# Patient Record
Sex: FEMALE | Race: OTHER | HISPANIC OR LATINO | Employment: FULL TIME | URBAN - METROPOLITAN AREA
[De-identification: names, ages, dates, MRNs, and addresses within clinical notes are randomized per-mention and may not be internally consistent; named-entity substitution may affect disease eponyms.]

---

## 2022-03-30 ENCOUNTER — HOSPITAL ENCOUNTER (OUTPATIENT)
Dept: RADIOLOGY | Facility: MEDICAL CENTER | Age: 42
End: 2022-03-30
Attending: PHYSICIAN ASSISTANT
Payer: COMMERCIAL

## 2022-03-30 DIAGNOSIS — Z12.31 VISIT FOR SCREENING MAMMOGRAM: ICD-10-CM

## 2022-03-30 PROCEDURE — 77063 BREAST TOMOSYNTHESIS BI: CPT

## 2022-04-11 ENCOUNTER — HOSPITAL ENCOUNTER (OUTPATIENT)
Dept: RADIOLOGY | Facility: MEDICAL CENTER | Age: 42
End: 2022-04-11
Attending: PHYSICIAN ASSISTANT
Payer: COMMERCIAL

## 2022-04-11 DIAGNOSIS — R92.8 ABNORMAL MAMMOGRAM: ICD-10-CM

## 2022-04-11 PROCEDURE — G0279 TOMOSYNTHESIS, MAMMO: HCPCS

## 2022-04-11 PROCEDURE — 76642 ULTRASOUND BREAST LIMITED: CPT | Mod: RT

## 2022-04-19 ENCOUNTER — HOSPITAL ENCOUNTER (OUTPATIENT)
Dept: RADIOLOGY | Facility: MEDICAL CENTER | Age: 42
End: 2022-04-19
Attending: PHYSICIAN ASSISTANT
Payer: COMMERCIAL

## 2022-04-19 DIAGNOSIS — R92.8 ABNORMAL FINDINGS ON DIAGNOSTIC IMAGING OF BREAST: ICD-10-CM

## 2022-04-19 LAB — PATHOLOGY CONSULT NOTE: NORMAL

## 2022-04-19 PROCEDURE — 19084 BX BREAST ADD LESION US IMAG: CPT | Mod: LT

## 2022-04-19 PROCEDURE — 88305 TISSUE EXAM BY PATHOLOGIST: CPT

## 2022-04-19 PROCEDURE — 19083 BX BREAST 1ST LESION US IMAG: CPT | Mod: RT

## 2022-04-19 PROCEDURE — 19083 BX BREAST 1ST LESION US IMAG: CPT | Mod: LT

## 2022-04-20 ENCOUNTER — TELEPHONE (OUTPATIENT)
Dept: RADIOLOGY | Facility: MEDICAL CENTER | Age: 42
End: 2022-04-20
Payer: COMMERCIAL

## 2022-06-20 ENCOUNTER — HOSPITAL ENCOUNTER (OUTPATIENT)
Facility: MEDICAL CENTER | Age: 42
End: 2022-06-20
Attending: OBSTETRICS & GYNECOLOGY | Admitting: OBSTETRICS & GYNECOLOGY
Payer: COMMERCIAL

## 2022-07-29 ENCOUNTER — PRE-ADMISSION TESTING (OUTPATIENT)
Dept: ADMISSIONS | Facility: MEDICAL CENTER | Age: 42
End: 2022-07-29
Attending: OBSTETRICS & GYNECOLOGY
Payer: COMMERCIAL

## 2022-07-29 RX ORDER — BENZONATATE 100 MG/1
CAPSULE ORAL
COMMUNITY
Start: 2022-07-26 | End: 2023-01-24

## 2022-07-29 RX ORDER — FLUTICASONE PROPIONATE AND SALMETEROL 500; 50 UG/1; UG/1
POWDER RESPIRATORY (INHALATION)
COMMUNITY
Start: 2022-06-08

## 2022-07-29 RX ORDER — ALBUTEROL SULFATE 1.25 MG/3ML
SOLUTION RESPIRATORY (INHALATION)
COMMUNITY
Start: 2022-07-26 | End: 2022-07-29

## 2022-07-29 RX ORDER — ACETAMINOPHEN 500 MG
500-1000 TABLET ORAL EVERY 6 HOURS PRN
COMMUNITY
End: 2023-01-24

## 2022-07-29 RX ORDER — IBUPROFEN 200 MG
200 TABLET ORAL EVERY 6 HOURS PRN
COMMUNITY
End: 2023-01-24

## 2022-07-29 RX ORDER — METOPROLOL SUCCINATE 25 MG/1
25 TABLET, EXTENDED RELEASE ORAL DAILY
COMMUNITY
Start: 2022-06-08

## 2022-07-29 RX ORDER — AMLODIPINE BESYLATE 5 MG/1
5 TABLET ORAL DAILY
COMMUNITY
Start: 2022-06-26

## 2022-07-29 NOTE — OR NURSING
7/15/22 pt tested positive for covid with sxs.  As of 7/29/22 pt still has sxs of congestion, cough, loss of voice.  Pt tested negative 7/28/22 at home.  Dr. Morales's office notified.

## 2022-08-26 ENCOUNTER — HOSPITAL ENCOUNTER (OUTPATIENT)
Facility: MEDICAL CENTER | Age: 42
End: 2022-08-26
Attending: OBSTETRICS & GYNECOLOGY | Admitting: OBSTETRICS & GYNECOLOGY
Payer: MEDICAID

## 2022-09-16 ENCOUNTER — PRE-ADMISSION TESTING (OUTPATIENT)
Dept: ADMISSIONS | Facility: MEDICAL CENTER | Age: 42
End: 2022-09-16
Attending: OBSTETRICS & GYNECOLOGY
Payer: MEDICAID

## 2022-09-16 VITALS — BODY MASS INDEX: 27.62 KG/M2 | HEIGHT: 65 IN | WEIGHT: 165.79 LBS

## 2022-09-16 DIAGNOSIS — Z01.810 PRE-OPERATIVE CARDIOVASCULAR EXAMINATION: ICD-10-CM

## 2022-09-16 DIAGNOSIS — Z01.812 PRE-OPERATIVE LABORATORY EXAMINATION: ICD-10-CM

## 2022-09-16 LAB
ALBUMIN SERPL BCP-MCNC: 4.7 G/DL (ref 3.2–4.9)
ALBUMIN/GLOB SERPL: 1.7 G/DL
ALP SERPL-CCNC: 92 U/L (ref 30–99)
ALT SERPL-CCNC: 32 U/L (ref 2–50)
AMORPH CRY #/AREA URNS HPF: PRESENT /HPF
ANION GAP SERPL CALC-SCNC: 12 MMOL/L (ref 7–16)
APPEARANCE UR: ABNORMAL
AST SERPL-CCNC: 21 U/L (ref 12–45)
BACTERIA #/AREA URNS HPF: ABNORMAL /HPF
BASOPHILS # BLD AUTO: 0.7 % (ref 0–1.8)
BASOPHILS # BLD: 0.08 K/UL (ref 0–0.12)
BILIRUB SERPL-MCNC: 0.4 MG/DL (ref 0.1–1.5)
BILIRUB UR QL STRIP.AUTO: NEGATIVE
BUN SERPL-MCNC: 20 MG/DL (ref 8–22)
CALCIUM SERPL-MCNC: 9.4 MG/DL (ref 8.5–10.5)
CAOX CRY #/AREA URNS HPF: ABNORMAL /HPF
CHLORIDE SERPL-SCNC: 101 MMOL/L (ref 96–112)
CO2 SERPL-SCNC: 25 MMOL/L (ref 20–33)
COLOR UR: YELLOW
CREAT SERPL-MCNC: 0.99 MG/DL (ref 0.5–1.4)
EKG IMPRESSION: NORMAL
EOSINOPHIL # BLD AUTO: 0.12 K/UL (ref 0–0.51)
EOSINOPHIL NFR BLD: 1.1 % (ref 0–6.9)
EPI CELLS #/AREA URNS HPF: ABNORMAL /HPF
ERYTHROCYTE [DISTWIDTH] IN BLOOD BY AUTOMATED COUNT: 45.1 FL (ref 35.9–50)
GFR SERPLBLD CREATININE-BSD FMLA CKD-EPI: 72 ML/MIN/1.73 M 2
GLOBULIN SER CALC-MCNC: 2.8 G/DL (ref 1.9–3.5)
GLUCOSE SERPL-MCNC: 70 MG/DL (ref 65–99)
GLUCOSE UR STRIP.AUTO-MCNC: NEGATIVE MG/DL
HCT VFR BLD AUTO: 40.9 % (ref 37–47)
HGB BLD-MCNC: 14.3 G/DL (ref 12–16)
HYALINE CASTS #/AREA URNS LPF: ABNORMAL /LPF
IMM GRANULOCYTES # BLD AUTO: 0.03 K/UL (ref 0–0.11)
IMM GRANULOCYTES NFR BLD AUTO: 0.3 % (ref 0–0.9)
KETONES UR STRIP.AUTO-MCNC: ABNORMAL MG/DL
LEUKOCYTE ESTERASE UR QL STRIP.AUTO: ABNORMAL
LYMPHOCYTES # BLD AUTO: 2.83 K/UL (ref 1–4.8)
LYMPHOCYTES NFR BLD: 26.2 % (ref 22–41)
MCH RBC QN AUTO: 33.3 PG (ref 27–33)
MCHC RBC AUTO-ENTMCNC: 35 G/DL (ref 33.6–35)
MCV RBC AUTO: 95.1 FL (ref 81.4–97.8)
MICRO URNS: ABNORMAL
MONOCYTES # BLD AUTO: 0.91 K/UL (ref 0–0.85)
MONOCYTES NFR BLD AUTO: 8.4 % (ref 0–13.4)
NEUTROPHILS # BLD AUTO: 6.83 K/UL (ref 2–7.15)
NEUTROPHILS NFR BLD: 63.3 % (ref 44–72)
NITRITE UR QL STRIP.AUTO: NEGATIVE
NRBC # BLD AUTO: 0 K/UL
NRBC BLD-RTO: 0 /100 WBC
PH UR STRIP.AUTO: 5.5 [PH] (ref 5–8)
PLATELET # BLD AUTO: 416 K/UL (ref 164–446)
PMV BLD AUTO: 9.3 FL (ref 9–12.9)
POTASSIUM SERPL-SCNC: 3.7 MMOL/L (ref 3.6–5.5)
PROT SERPL-MCNC: 7.5 G/DL (ref 6–8.2)
PROT UR QL STRIP: NEGATIVE MG/DL
RBC # BLD AUTO: 4.3 M/UL (ref 4.2–5.4)
RBC # URNS HPF: ABNORMAL /HPF
RBC UR QL AUTO: ABNORMAL
SODIUM SERPL-SCNC: 138 MMOL/L (ref 135–145)
SP GR UR STRIP.AUTO: 1.03
UROBILINOGEN UR STRIP.AUTO-MCNC: 1 MG/DL
WBC # BLD AUTO: 10.8 K/UL (ref 4.8–10.8)
WBC #/AREA URNS HPF: ABNORMAL /HPF

## 2022-09-16 PROCEDURE — 80053 COMPREHEN METABOLIC PANEL: CPT

## 2022-09-16 PROCEDURE — 93005 ELECTROCARDIOGRAM TRACING: CPT

## 2022-09-16 PROCEDURE — 85025 COMPLETE CBC W/AUTO DIFF WBC: CPT

## 2022-09-16 PROCEDURE — 93010 ELECTROCARDIOGRAM REPORT: CPT | Performed by: INTERNAL MEDICINE

## 2022-09-16 PROCEDURE — 81001 URINALYSIS AUTO W/SCOPE: CPT

## 2022-09-16 PROCEDURE — 36415 COLL VENOUS BLD VENIPUNCTURE: CPT

## 2022-09-19 ENCOUNTER — HOSPITAL ENCOUNTER (OUTPATIENT)
Dept: LAB | Facility: MEDICAL CENTER | Age: 42
End: 2022-09-19
Attending: ORTHOPAEDIC SURGERY
Payer: MEDICAID

## 2022-09-19 LAB
BASOPHILS # BLD AUTO: 0.7 % (ref 0–1.8)
BASOPHILS # BLD: 0.06 K/UL (ref 0–0.12)
CRP SERPL HS-MCNC: <0.3 MG/DL (ref 0–0.75)
EOSINOPHIL # BLD AUTO: 0.11 K/UL (ref 0–0.51)
EOSINOPHIL NFR BLD: 1.2 % (ref 0–6.9)
ERYTHROCYTE [DISTWIDTH] IN BLOOD BY AUTOMATED COUNT: 45.5 FL (ref 35.9–50)
ERYTHROCYTE [SEDIMENTATION RATE] IN BLOOD BY WESTERGREN METHOD: 6 MM/HOUR (ref 0–25)
HCT VFR BLD AUTO: 41 % (ref 37–47)
HGB BLD-MCNC: 13.7 G/DL (ref 12–16)
IMM GRANULOCYTES # BLD AUTO: 0.04 K/UL (ref 0–0.11)
IMM GRANULOCYTES NFR BLD AUTO: 0.4 % (ref 0–0.9)
LYMPHOCYTES # BLD AUTO: 2.76 K/UL (ref 1–4.8)
LYMPHOCYTES NFR BLD: 30.2 % (ref 22–41)
MCH RBC QN AUTO: 32.2 PG (ref 27–33)
MCHC RBC AUTO-ENTMCNC: 33.4 G/DL (ref 33.6–35)
MCV RBC AUTO: 96.5 FL (ref 81.4–97.8)
MONOCYTES # BLD AUTO: 0.7 K/UL (ref 0–0.85)
MONOCYTES NFR BLD AUTO: 7.7 % (ref 0–13.4)
NEUTROPHILS # BLD AUTO: 5.48 K/UL (ref 2–7.15)
NEUTROPHILS NFR BLD: 59.8 % (ref 44–72)
NRBC # BLD AUTO: 0 K/UL
NRBC BLD-RTO: 0 /100 WBC
PLATELET # BLD AUTO: 381 K/UL (ref 164–446)
PMV BLD AUTO: 9.7 FL (ref 9–12.9)
RBC # BLD AUTO: 4.25 M/UL (ref 4.2–5.4)
RHEUMATOID FACT SER IA-ACNC: <10 IU/ML (ref 0–14)
URATE SERPL-MCNC: 5.3 MG/DL (ref 1.9–8.2)
WBC # BLD AUTO: 9.2 K/UL (ref 4.8–10.8)

## 2022-09-19 PROCEDURE — 85652 RBC SED RATE AUTOMATED: CPT

## 2022-09-19 PROCEDURE — 86431 RHEUMATOID FACTOR QUANT: CPT

## 2022-09-19 PROCEDURE — 85025 COMPLETE CBC W/AUTO DIFF WBC: CPT

## 2022-09-19 PROCEDURE — 86200 CCP ANTIBODY: CPT

## 2022-09-19 PROCEDURE — 84550 ASSAY OF BLOOD/URIC ACID: CPT

## 2022-09-19 PROCEDURE — 86812 HLA TYPING A B OR C: CPT

## 2022-09-19 PROCEDURE — 86140 C-REACTIVE PROTEIN: CPT

## 2022-09-19 PROCEDURE — 36415 COLL VENOUS BLD VENIPUNCTURE: CPT

## 2022-09-19 PROCEDURE — 86038 ANTINUCLEAR ANTIBODIES: CPT

## 2022-09-21 LAB — HLA-B27 QL FC: NEGATIVE

## 2022-09-22 LAB
CCP IGG SERPL-ACNC: 2 UNITS (ref 0–19)
NUCLEAR IGG SER QL IA: NORMAL

## 2022-09-23 ASSESSMENT — ENCOUNTER SYMPTOMS
CARDIOVASCULAR NEGATIVE: 1
CONSTITUTIONAL NEGATIVE: 1
GASTROINTESTINAL NEGATIVE: 1
RESPIRATORY NEGATIVE: 1

## 2022-09-23 NOTE — H&P
Obstetrics & Gynecology History & Physical Note    Date of Service  2022    Chief Complaint  Scheduled for TLH bilateral salpingectomy possible unilateral or bilateral oopherectomy, lysis of adhesions, and treatment of endometriosis and all other indicated procedures.    History of Presenting Illness  Gina Little is a 42 y.o.   . She is being admitted for Scheduled for TLH bilateral salpingectomy possible unilateral or bilateral oopherectomy, lysis of adhesions, and treatment of endometriosis and all other indicated procedures.    There are no problems to display for this patient.      Obstetric History  OB History   No obstetric history on file.       Gynecologic History  AUb for years going into anemia . Chronic pelvic pain from endometriosis.  surgery was rescheduled due to Covid.     Review of Systems  Review of Systems   Constitutional: Negative.    Respiratory: Negative.     Cardiovascular: Negative.    Gastrointestinal: Negative.    Genitourinary: Negative.      Medical History   has a past medical history of Anesthesia (2022), Arrhythmia, Arthritis (2022), Asthma (2022), Endometriosis (2022), Fatty liver, Gynecological disorder (2022), Infectious disease (2022), Palpitations, Pneumonia (2022), PONV (postoperative nausea and vomiting) (2022), and PONV (postoperative nausea and vomiting) (2022).    Surgical History   has a past surgical history that includes tubal ligation; tonsillectomy; other orthopedic surgery (Left); and other orthopedic surgery (Right).     Family History  family history is not on file.     Social History   reports that she has never smoked. She has never been exposed to tobacco smoke. She has never used smokeless tobacco. She reports that she does not drink alcohol and does not use drugs.    Allergies  Allergies   Allergen Reactions    Hydrocodone-Acetaminophen Vomiting and Nausea     VICODIN    Morphine  "Vomiting and Nausea    Penicillin G Hives and Shortness of Breath     Reaction At AGE 10  Pt. States Was Given For Pneumonia.        Medications  Cannot display prior to admission medications because the patient has not been admitted in this contact.       Physical Exam   See EMR for current viltas.  Vitals:    09/16/22 0838   Weight: 75.2 kg (165 lb 12.6 oz)   Height: 1.651 m (5' 5\")       General:   no acute distress, alert, cooperative, no distress   Skin:   normal   HEENT:  PERRLA and extraocular movements intact   Lungs:   CTA bilateral, clear to auscultation bilaterally   Heart:   regular rate and rhythm   Breasts:   deferred   Abdomen:  Abdomen soft, non-tender. BS normal. No masses,  No organomegaly   Pelvis: Exam deferred.                                                .cbc      Assessment:  42 y.o. 43 y/o G4 P 2022     NSVDx2.  UTERINE FIBROIDS, ABNORMAL UTERINE BLEEDING, ENDOMETRIOSIS       s/p sterilisation ovarian cyst removed.     recently diagnosed with kidney stones has appointment for removal.    AUB0- seen in [place] tried BC pills for 2 years did not like side effects and stopped. also had IUD for painful heavy preiods and contraception, IUD moved needed D&C to remove and had sterilisation and ovarian cysts taken care of same time.  dyspareunia.  diagnosed as endometriosis went on lupron for 9 mths and stopped due to sideeffects.  pelvic ultrasound-multiple fibroids. right ovarian cyst 2.5cms.    EMB- benign proliferative.    discussed options to control bleeding and for pelvic pain.   Pt desires hysterectomy talked about removal of ovaries if severe endometriosis noted during surgery otherwise will preserve one or both ovaries.  Plan:  Scheduled for TLH bilateral salpingectomy possible unilateral or bilateral oopherectomy, lysis of adhesions, and treatment of endometriosis and all other indicated procedures.    Discussed with the patient indications for Scheduled for TLH bilateral " salpingectomy possible unilateral or bilateral oopherectomy, lysis of adhesions, and treatment of endometriosis and all other indicated procedures. . The patient voiced understanding of indications for surgery  at this time.   Discussed with the patient the risks of Surgery . The risks include infection, bleeding, scarring, damage to other organs in the area of operation. Specifically organs that can be damaged are bowel, bladder, ureters. I also discussed with the patient the risk of wound infection and wound breakdown. We discussed that these risks are greater in people with high risk factors I also discussed the risk of emergency blood transfusion during procedure .   Patient had the opportunity to ask questions regarding procedures. All questions answered to the patient's satisfaction.   Proceed with Surgery      Allan Dumont M.D.

## 2022-10-04 ENCOUNTER — HOSPITAL ENCOUNTER (OUTPATIENT)
Dept: RADIOLOGY | Facility: MEDICAL CENTER | Age: 42
End: 2022-10-04
Attending: PHYSICIAN ASSISTANT
Payer: MEDICAID

## 2022-10-04 DIAGNOSIS — R92.8 FOLLOW-UP EXAMINATION OF ABNORMAL MAMMOGRAM: ICD-10-CM

## 2022-10-04 PROCEDURE — 76642 ULTRASOUND BREAST LIMITED: CPT | Mod: RT

## 2022-10-05 RX ORDER — SODIUM CHLORIDE, SODIUM LACTATE, POTASSIUM CHLORIDE, CALCIUM CHLORIDE 600; 310; 30; 20 MG/100ML; MG/100ML; MG/100ML; MG/100ML
INJECTION, SOLUTION INTRAVENOUS CONTINUOUS
Status: CANCELLED | OUTPATIENT
Start: 2022-10-05 | End: 2022-10-05

## 2023-01-24 ENCOUNTER — OFFICE VISIT (OUTPATIENT)
Dept: URGENT CARE | Facility: PHYSICIAN GROUP | Age: 43
End: 2023-01-24
Payer: MEDICAID

## 2023-01-24 ENCOUNTER — APPOINTMENT (OUTPATIENT)
Dept: RADIOLOGY | Facility: IMAGING CENTER | Age: 43
End: 2023-01-24
Attending: PHYSICIAN ASSISTANT
Payer: MEDICAID

## 2023-01-24 VITALS
BODY MASS INDEX: 27.49 KG/M2 | HEIGHT: 65 IN | RESPIRATION RATE: 16 BRPM | TEMPERATURE: 98.2 F | HEART RATE: 94 BPM | SYSTOLIC BLOOD PRESSURE: 134 MMHG | OXYGEN SATURATION: 97 % | WEIGHT: 165 LBS | DIASTOLIC BLOOD PRESSURE: 90 MMHG

## 2023-01-24 DIAGNOSIS — M25.572 ACUTE LEFT ANKLE PAIN: ICD-10-CM

## 2023-01-24 PROBLEM — R00.2 PALPITATIONS: Status: ACTIVE | Noted: 2022-07-21

## 2023-01-24 PROCEDURE — 99203 OFFICE O/P NEW LOW 30 MIN: CPT | Performed by: PHYSICIAN ASSISTANT

## 2023-01-24 PROCEDURE — 73610 X-RAY EXAM OF ANKLE: CPT | Mod: TC,FY,LT | Performed by: PHYSICIAN ASSISTANT

## 2023-01-24 RX ORDER — ALBUTEROL SULFATE 90 UG/1
1 AEROSOL, METERED RESPIRATORY (INHALATION)
COMMUNITY

## 2023-01-24 RX ORDER — ALBUTEROL SULFATE 90 UG/1
AEROSOL, METERED RESPIRATORY (INHALATION)
COMMUNITY
Start: 2022-11-28

## 2023-01-24 RX ORDER — LOSARTAN POTASSIUM 50 MG/1
TABLET ORAL
COMMUNITY
Start: 2022-03-02

## 2023-01-24 RX ORDER — METHYLPREDNISOLONE 4 MG/1
4 TABLET ORAL DAILY
Qty: 21 TABLET | Refills: 0 | Status: SHIPPED | OUTPATIENT
Start: 2023-01-24 | End: 2023-07-05

## 2023-01-24 RX ORDER — METHOCARBAMOL 750 MG/1
TABLET, FILM COATED ORAL
COMMUNITY
Start: 2023-01-06 | End: 2023-07-05

## 2023-01-24 RX ORDER — FLUOROMETHOLONE 1 MG/ML
SUSPENSION/ DROPS OPHTHALMIC
COMMUNITY
Start: 2022-12-19 | End: 2024-02-16

## 2023-01-24 RX ORDER — MELOXICAM 15 MG/1
TABLET ORAL
COMMUNITY
Start: 2023-01-06 | End: 2023-07-05

## 2023-01-24 RX ORDER — AZELASTINE HYDROCHLORIDE 0.5 MG/ML
SOLUTION/ DROPS OPHTHALMIC
COMMUNITY
Start: 2023-01-08 | End: 2024-02-16

## 2023-01-24 RX ORDER — POLYMYXIN B SULFATE AND TRIMETHOPRIM 1; 10000 MG/ML; [USP'U]/ML
SOLUTION OPHTHALMIC
COMMUNITY
Start: 2022-11-18 | End: 2024-02-16

## 2023-01-24 RX ORDER — AMLODIPINE BESYLATE 5 MG/1
TABLET ORAL
COMMUNITY
Start: 2021-10-24 | End: 2023-07-05

## 2023-01-24 RX ORDER — FLUTICASONE PROPIONATE AND SALMETEROL 500; 50 UG/1; UG/1
1 POWDER RESPIRATORY (INHALATION) 2 TIMES DAILY
COMMUNITY
End: 2024-02-16

## 2023-01-24 RX ORDER — FLUTICASONE PROPIONATE 50 MCG
SPRAY, SUSPENSION (ML) NASAL
COMMUNITY
Start: 2022-12-18 | End: 2023-07-05

## 2023-01-24 ASSESSMENT — ENCOUNTER SYMPTOMS
NEUROLOGICAL NEGATIVE: 1
RESPIRATORY NEGATIVE: 1
CARDIOVASCULAR NEGATIVE: 1
CONSTITUTIONAL NEGATIVE: 1

## 2023-01-24 ASSESSMENT — FIBROSIS 4 INDEX: FIB4 SCORE: 0.42

## 2023-01-24 NOTE — PROGRESS NOTES
Subjective:     Gina Little  is a 42 y.o. female who presents for Ankle Injury (Left ankle pain woke up hurting.  hx arthritis, does walk a lot at work.  )       She presents today with left ankle pain that has been ongoing over the last several weeks.  Denies any specific injury but states that the symptoms have been worsening since onset.  States that the pain is worsened with long periods of ambulation and worsened with the first few steps when she has been resting for a long period of time.  Notes history of early onset osteoarthritis in bilateral hips and is concerned that she may have arthritis in her ankle.  Distal neurovascular function intact.     Review of Systems   Constitutional: Negative.    Respiratory: Negative.     Cardiovascular: Negative.    Musculoskeletal:  Positive for joint pain.   Neurological: Negative.     Allergies   Allergen Reactions    Hydrocodone-Acetaminophen Vomiting and Nausea     VICODIN    Morphine Vomiting and Nausea    Penicillin G Hives and Shortness of Breath     Reaction At AGE 10  Pt. States Was Given For Pneumonia.      Past Medical History:   Diagnosis Date    Anesthesia 07/29/2022    pt with strong history of motion sickness    Arrhythmia     Arthritis 07/29/2022    osteo bilateral hips    Asthma 07/29/2022    uses inhalers daily    Endometriosis 09/16/2022    Fatty liver     Gynecological disorder 09/16/2022    Infectious disease 07/29/2022    7/15/22 pt tested positive for covid with sxs.  As of 7/29/22 pt still has sxs of congestion, cough, loss of voice.  Pt tested negative 7/28/22 at home.  Dr. Morales's office notified.    Palpitations     Pneumonia 07/29/2022    AGE 10    PONV (postoperative nausea and vomiting) 07/29/2022    pt with strong history of motion sickness    PONV (postoperative nausea and vomiting) 09/16/2022    Patient states, Severe PONV With Surgeries.        Objective:   BP (!) 134/90   Pulse 94   Temp 36.8 °C (98.2 °F) (Temporal)    "Resp 16   Ht 1.651 m (5' 5\")   Wt 74.8 kg (165 lb)   SpO2 97%   BMI 27.46 kg/m²   Physical Exam  Vitals and nursing note reviewed.   Constitutional:       General: She is not in acute distress.     Appearance: She is not ill-appearing or toxic-appearing.   HENT:      Head: Normocephalic.      Nose: No rhinorrhea.   Eyes:      General: No scleral icterus.     Conjunctiva/sclera: Conjunctivae normal.   Pulmonary:      Effort: Pulmonary effort is normal. No respiratory distress.      Breath sounds: No stridor.   Musculoskeletal:      Cervical back: Neck supple.      Comments: Examination of the left ankle does reveal tenderness palpation along the joint line of the ankle mortise.  Pain with end range of dorsiflexion and plantarflexion.  No tenderness along the medial and lateral malleoli.  Distal neurovascular function intact.   Neurological:      Mental Status: She is alert and oriented to person, place, and time.   Psychiatric:         Mood and Affect: Mood normal.         Behavior: Behavior normal.         Thought Content: Thought content normal.         Judgment: Judgment normal.           Diagnostic testing:    Left ankle x-ray series  Radiologist IMPRESSION:  No evidence of fracture or dislocation.    Assessment/Plan:     Encounter Diagnoses   Name Primary?    Acute left ankle pain           Plan for care for today's complaint includes Medrol Dosepak for acute left-sided ankle pain.  Continue to follow with primary care provider for further evaluation and management.  Radiographic imaging was negative for acute pathology on exam today.  Continue to monitor symptoms and return to urgent care or follow-up with primary care provider if symptoms remain ongoing.  Follow-up in the emergency department if symptoms become severe, ER precautions discussed in office today..  Prescription for Medrol Dosepak provided.    See AVS Instructions below for written guidance provided to patient on after-visit management and " care in addition to our verbal discussion during the visit.    Please note that this dictation was created using voice recognition software. I have attempted to correct all errors, but there may be sound-alike, spelling, grammar and possibly content errors that I did not discover before finalizing the note.    Pb Olvera PA-C

## 2023-01-30 ENCOUNTER — HOSPITAL ENCOUNTER (OUTPATIENT)
Dept: LAB | Facility: MEDICAL CENTER | Age: 43
End: 2023-01-30
Attending: PHYSICIAN ASSISTANT
Payer: MEDICAID

## 2023-01-30 LAB
ESTRADIOL SERPL-MCNC: 113 PG/ML
FSH SERPL-ACNC: 10.4 MIU/ML
LH SERPL-ACNC: 5 IU/L
PROGEST SERPL-MCNC: 0.25 NG/ML
TESTOST SERPL-MCNC: <12 NG/DL (ref 9–75)

## 2023-01-30 PROCEDURE — 82670 ASSAY OF TOTAL ESTRADIOL: CPT

## 2023-01-30 PROCEDURE — 36415 COLL VENOUS BLD VENIPUNCTURE: CPT

## 2023-01-30 PROCEDURE — 84144 ASSAY OF PROGESTERONE: CPT

## 2023-01-30 PROCEDURE — 83002 ASSAY OF GONADOTROPIN (LH): CPT

## 2023-01-30 PROCEDURE — 84403 ASSAY OF TOTAL TESTOSTERONE: CPT

## 2023-01-30 PROCEDURE — 83001 ASSAY OF GONADOTROPIN (FSH): CPT

## 2023-02-01 ENCOUNTER — HOSPITAL ENCOUNTER (OUTPATIENT)
Dept: RADIOLOGY | Facility: MEDICAL CENTER | Age: 43
End: 2023-02-01
Attending: PHYSICIAN ASSISTANT
Payer: MEDICAID

## 2023-02-01 DIAGNOSIS — M25.559 HIP PAIN: ICD-10-CM

## 2023-02-01 PROCEDURE — 73721 MRI JNT OF LWR EXTRE W/O DYE: CPT | Mod: RT

## 2023-02-10 ENCOUNTER — HOSPITAL ENCOUNTER (OUTPATIENT)
Dept: LAB | Facility: MEDICAL CENTER | Age: 43
End: 2023-02-10
Attending: PHYSICIAN ASSISTANT
Payer: MEDICAID

## 2023-02-10 LAB
ERYTHROCYTE [DISTWIDTH] IN BLOOD BY AUTOMATED COUNT: 41.4 FL (ref 35.9–50)
FERRITIN SERPL-MCNC: 52.4 NG/ML (ref 10–291)
HCT VFR BLD AUTO: 39 % (ref 37–47)
HGB BLD-MCNC: 13.5 G/DL (ref 12–16)
IRON SATN MFR SERPL: 21 % (ref 15–55)
IRON SERPL-MCNC: 71 UG/DL (ref 40–170)
MCH RBC QN AUTO: 32.9 PG (ref 27–33)
MCHC RBC AUTO-ENTMCNC: 34.6 G/DL (ref 33.6–35)
MCV RBC AUTO: 95.1 FL (ref 81.4–97.8)
PLATELET # BLD AUTO: 377 K/UL (ref 164–446)
PMV BLD AUTO: 9.6 FL (ref 9–12.9)
RBC # BLD AUTO: 4.1 M/UL (ref 4.2–5.4)
TIBC SERPL-MCNC: 342 UG/DL (ref 250–450)
TRANSFERRIN SERPL-MCNC: 286 MG/DL (ref 200–370)
UIBC SERPL-MCNC: 271 UG/DL (ref 110–370)
WBC # BLD AUTO: 10.5 K/UL (ref 4.8–10.8)

## 2023-02-10 PROCEDURE — 84466 ASSAY OF TRANSFERRIN: CPT

## 2023-02-10 PROCEDURE — 83540 ASSAY OF IRON: CPT

## 2023-02-10 PROCEDURE — 85027 COMPLETE CBC AUTOMATED: CPT

## 2023-02-10 PROCEDURE — 36415 COLL VENOUS BLD VENIPUNCTURE: CPT

## 2023-02-10 PROCEDURE — 82728 ASSAY OF FERRITIN: CPT

## 2023-02-10 PROCEDURE — 83550 IRON BINDING TEST: CPT

## 2023-03-10 ENCOUNTER — HOSPITAL ENCOUNTER (OUTPATIENT)
Dept: LAB | Facility: MEDICAL CENTER | Age: 43
End: 2023-03-10
Attending: PHYSICIAN ASSISTANT
Payer: MEDICAID

## 2023-03-10 PROCEDURE — 86140 C-REACTIVE PROTEIN: CPT

## 2023-03-10 PROCEDURE — 86431 RHEUMATOID FACTOR QUANT: CPT

## 2023-03-10 PROCEDURE — 86038 ANTINUCLEAR ANTIBODIES: CPT

## 2023-03-10 PROCEDURE — 36415 COLL VENOUS BLD VENIPUNCTURE: CPT

## 2023-03-10 PROCEDURE — 85652 RBC SED RATE AUTOMATED: CPT

## 2023-03-11 LAB
CRP SERPL HS-MCNC: <0.3 MG/DL (ref 0–0.75)
ERYTHROCYTE [SEDIMENTATION RATE] IN BLOOD BY WESTERGREN METHOD: 13 MM/HOUR (ref 0–25)
RHEUMATOID FACT SER IA-ACNC: <10 IU/ML (ref 0–14)

## 2023-03-13 LAB — NUCLEAR IGG SER QL IA: NORMAL

## 2023-04-25 ENCOUNTER — HOSPITAL ENCOUNTER (OUTPATIENT)
Dept: LAB | Facility: MEDICAL CENTER | Age: 43
End: 2023-04-25
Attending: PHYSICIAN ASSISTANT
Payer: MEDICAID

## 2023-04-25 PROCEDURE — 80053 COMPREHEN METABOLIC PANEL: CPT

## 2023-04-25 PROCEDURE — 84100 ASSAY OF PHOSPHORUS: CPT

## 2023-04-25 PROCEDURE — 36415 COLL VENOUS BLD VENIPUNCTURE: CPT

## 2023-04-25 PROCEDURE — 82306 VITAMIN D 25 HYDROXY: CPT

## 2023-04-25 PROCEDURE — 83970 ASSAY OF PARATHORMONE: CPT

## 2023-04-26 LAB
25(OH)D3 SERPL-MCNC: 15 NG/ML (ref 30–100)
ALBUMIN SERPL BCP-MCNC: 4.7 G/DL (ref 3.2–4.9)
ALBUMIN/GLOB SERPL: 1.9 G/DL
ALP SERPL-CCNC: 65 U/L (ref 30–99)
ALT SERPL-CCNC: 34 U/L (ref 2–50)
ANION GAP SERPL CALC-SCNC: 13 MMOL/L (ref 7–16)
AST SERPL-CCNC: 19 U/L (ref 12–45)
BILIRUB SERPL-MCNC: 0.3 MG/DL (ref 0.1–1.5)
BUN SERPL-MCNC: 8 MG/DL (ref 8–22)
CALCIUM ALBUM COR SERPL-MCNC: 8.7 MG/DL (ref 8.5–10.5)
CALCIUM SERPL-MCNC: 9.3 MG/DL (ref 8.5–10.5)
CHLORIDE SERPL-SCNC: 105 MMOL/L (ref 96–112)
CO2 SERPL-SCNC: 21 MMOL/L (ref 20–33)
CREAT SERPL-MCNC: 0.78 MG/DL (ref 0.5–1.4)
GFR SERPLBLD CREATININE-BSD FMLA CKD-EPI: 96 ML/MIN/1.73 M 2
GLOBULIN SER CALC-MCNC: 2.5 G/DL (ref 1.9–3.5)
GLUCOSE SERPL-MCNC: 89 MG/DL (ref 65–99)
PHOSPHATE SERPL-MCNC: 3 MG/DL (ref 2.5–4.5)
POTASSIUM SERPL-SCNC: 4.1 MMOL/L (ref 3.6–5.5)
PROT SERPL-MCNC: 7.2 G/DL (ref 6–8.2)
PTH-INTACT SERPL-MCNC: 47.4 PG/ML (ref 14–72)
SODIUM SERPL-SCNC: 139 MMOL/L (ref 135–145)

## 2023-04-27 ENCOUNTER — HOSPITAL ENCOUNTER (OUTPATIENT)
Facility: MEDICAL CENTER | Age: 43
End: 2023-04-27
Attending: PHYSICIAN ASSISTANT
Payer: MEDICAID

## 2023-04-27 PROCEDURE — 82340 ASSAY OF CALCIUM IN URINE: CPT

## 2023-05-02 LAB
CALCIUM 24H UR-MCNC: 18.2 MG/DL
CALCIUM 24H UR-MRATE: 155 MG/D (ref 100–250)
CALCIUM/CREAT 24H UR: 134 MG/G (ref 20–300)
COLLECT DURATION TIME SPEC: 24 HRS
CREAT 24H UR-MCNC: 136 MG/DL
CREAT 24H UR-MRATE: 1156 MG/D (ref 700–1600)
SPECIMEN VOL ?TM UR: 850 ML

## 2023-05-19 ENCOUNTER — HOSPITAL ENCOUNTER (OUTPATIENT)
Dept: RADIOLOGY | Facility: MEDICAL CENTER | Age: 43
End: 2023-05-19
Attending: PHYSICIAN ASSISTANT
Payer: MEDICAID

## 2023-05-19 DIAGNOSIS — M89.8X9 BONE PAIN: ICD-10-CM

## 2023-05-19 PROCEDURE — 77080 DXA BONE DENSITY AXIAL: CPT

## 2023-06-09 ENCOUNTER — HOSPITAL ENCOUNTER (OUTPATIENT)
Dept: LAB | Facility: MEDICAL CENTER | Age: 43
End: 2023-06-09
Attending: PHYSICIAN ASSISTANT
Payer: MEDICAID

## 2023-06-09 DIAGNOSIS — Z01.812 PRE-OPERATIVE LABORATORY EXAMINATION: ICD-10-CM

## 2023-06-09 LAB
ANION GAP SERPL CALC-SCNC: 14 MMOL/L (ref 7–16)
APPEARANCE UR: CLEAR
BACTERIA #/AREA URNS HPF: ABNORMAL /HPF
BASOPHILS # BLD AUTO: 1.1 % (ref 0–1.8)
BASOPHILS # BLD: 0.07 K/UL (ref 0–0.12)
BILIRUB UR QL STRIP.AUTO: NEGATIVE
BUN SERPL-MCNC: 13 MG/DL (ref 8–22)
CALCIUM SERPL-MCNC: 9.7 MG/DL (ref 8.5–10.5)
CHLORIDE SERPL-SCNC: 104 MMOL/L (ref 96–112)
CO2 SERPL-SCNC: 21 MMOL/L (ref 20–33)
COLOR UR: YELLOW
CREAT SERPL-MCNC: 0.8 MG/DL (ref 0.5–1.4)
EOSINOPHIL # BLD AUTO: 0.76 K/UL (ref 0–0.51)
EOSINOPHIL NFR BLD: 12.5 % (ref 0–6.9)
EPI CELLS #/AREA URNS HPF: ABNORMAL /HPF
ERYTHROCYTE [DISTWIDTH] IN BLOOD BY AUTOMATED COUNT: 39.6 FL (ref 35.9–50)
GFR SERPLBLD CREATININE-BSD FMLA CKD-EPI: 93 ML/MIN/1.73 M 2
GLUCOSE SERPL-MCNC: 99 MG/DL (ref 65–99)
GLUCOSE UR STRIP.AUTO-MCNC: NEGATIVE MG/DL
HCT VFR BLD AUTO: 39.5 % (ref 37–47)
HGB BLD-MCNC: 13.6 G/DL (ref 12–16)
HYALINE CASTS #/AREA URNS LPF: ABNORMAL /LPF
IMM GRANULOCYTES # BLD AUTO: 0.01 K/UL (ref 0–0.11)
IMM GRANULOCYTES NFR BLD AUTO: 0.2 % (ref 0–0.9)
KETONES UR STRIP.AUTO-MCNC: ABNORMAL MG/DL
LEUKOCYTE ESTERASE UR QL STRIP.AUTO: ABNORMAL
LYMPHOCYTES # BLD AUTO: 1.88 K/UL (ref 1–4.8)
LYMPHOCYTES NFR BLD: 30.9 % (ref 22–41)
MCH RBC QN AUTO: 32.7 PG (ref 27–33)
MCHC RBC AUTO-ENTMCNC: 34.4 G/DL (ref 32.2–35.5)
MCV RBC AUTO: 95 FL (ref 81.4–97.8)
MICRO URNS: ABNORMAL
MONOCYTES # BLD AUTO: 0.49 K/UL (ref 0–0.85)
MONOCYTES NFR BLD AUTO: 8 % (ref 0–13.4)
NEUTROPHILS # BLD AUTO: 2.88 K/UL (ref 1.82–7.42)
NEUTROPHILS NFR BLD: 47.3 % (ref 44–72)
NITRITE UR QL STRIP.AUTO: NEGATIVE
NRBC # BLD AUTO: 0 K/UL
NRBC BLD-RTO: 0 /100 WBC (ref 0–0.2)
PH UR STRIP.AUTO: 5.5 [PH] (ref 5–8)
PLATELET # BLD AUTO: 371 K/UL (ref 164–446)
PMV BLD AUTO: 9.6 FL (ref 9–12.9)
POTASSIUM SERPL-SCNC: 4.1 MMOL/L (ref 3.6–5.5)
PROT UR QL STRIP: NEGATIVE MG/DL
RBC # BLD AUTO: 4.16 M/UL (ref 4.2–5.4)
RBC # URNS HPF: ABNORMAL /HPF
RBC UR QL AUTO: NEGATIVE
SODIUM SERPL-SCNC: 139 MMOL/L (ref 135–145)
SP GR UR STRIP.AUTO: 1.02
URATE SERPL-MCNC: 6.3 MG/DL (ref 1.9–8.2)
UROBILINOGEN UR STRIP.AUTO-MCNC: 0.2 MG/DL
WBC # BLD AUTO: 6.1 K/UL (ref 4.8–10.8)
WBC #/AREA URNS HPF: ABNORMAL /HPF

## 2023-06-09 PROCEDURE — 80048 BASIC METABOLIC PNL TOTAL CA: CPT

## 2023-06-09 PROCEDURE — 36415 COLL VENOUS BLD VENIPUNCTURE: CPT

## 2023-06-09 PROCEDURE — 85025 COMPLETE CBC W/AUTO DIFF WBC: CPT

## 2023-06-09 PROCEDURE — 81001 URINALYSIS AUTO W/SCOPE: CPT

## 2023-06-09 PROCEDURE — 86200 CCP ANTIBODY: CPT

## 2023-06-09 PROCEDURE — 84550 ASSAY OF BLOOD/URIC ACID: CPT

## 2023-06-12 LAB — CCP IGG SERPL-ACNC: 3 UNITS (ref 0–19)

## 2023-07-05 ENCOUNTER — OFFICE VISIT (OUTPATIENT)
Dept: URGENT CARE | Facility: PHYSICIAN GROUP | Age: 43
End: 2023-07-05
Payer: MEDICAID

## 2023-07-05 VITALS
RESPIRATION RATE: 15 BRPM | OXYGEN SATURATION: 98 % | DIASTOLIC BLOOD PRESSURE: 88 MMHG | BODY MASS INDEX: 28.32 KG/M2 | HEART RATE: 88 BPM | WEIGHT: 170 LBS | HEIGHT: 65 IN | TEMPERATURE: 97.8 F | SYSTOLIC BLOOD PRESSURE: 134 MMHG

## 2023-07-05 DIAGNOSIS — M75.21 BICEPS TENDINITIS OF RIGHT UPPER EXTREMITY: ICD-10-CM

## 2023-07-05 PROCEDURE — 3079F DIAST BP 80-89 MM HG: CPT | Performed by: PHYSICIAN ASSISTANT

## 2023-07-05 PROCEDURE — 3075F SYST BP GE 130 - 139MM HG: CPT | Performed by: PHYSICIAN ASSISTANT

## 2023-07-05 PROCEDURE — 99213 OFFICE O/P EST LOW 20 MIN: CPT | Performed by: PHYSICIAN ASSISTANT

## 2023-07-05 RX ORDER — METHYLPREDNISOLONE 4 MG/1
TABLET ORAL
Qty: 21 TABLET | Refills: 0 | Status: SHIPPED | OUTPATIENT
Start: 2023-07-05 | End: 2024-02-16

## 2023-07-05 ASSESSMENT — ENCOUNTER SYMPTOMS
TINGLING: 0
SHORTNESS OF BREATH: 0
COUGH: 0
HEADACHES: 0
MYALGIAS: 1
FEVER: 0
FALLS: 0
PALPITATIONS: 1
NECK PAIN: 0
WEAKNESS: 0
DIZZINESS: 0

## 2023-07-05 ASSESSMENT — FIBROSIS 4 INDEX: FIB4 SCORE: 0.39

## 2023-07-05 NOTE — PROGRESS NOTES
Subjective:     CHIEF COMPLAINT  Chief Complaint   Patient presents with    Arm Pain     Left arm pain from elbow to shoulder, not going away, getting worse. No known injury x 5 days        HPI  Gina Little is a very pleasant 43 y.o. female who presents to the clinic with left shoulder and biceps pain x5 days.  Patient denies any preceding injury or trauma.  No change in activity level.  Currently describes a burning sensation near her proximal biceps that extends down the distribution of her left bicep.  Describes an occasional dull achy pain with occasional sharp stabbing pains with certain movements.  No numbness or tingling distally.  Full range of motion of the shoulder and elbow.  Denies any associated chest pain or pressure.  She is currently being worked up for SVT by cardiology.  No dyspnea or chest pain with exertion.  She has not noted any swelling of the upper extremities.  No history of DVT or PE.  No neck pain or stiffness.  Currently taking Tylenol extra strength without any significant relief.    REVIEW OF SYSTEMS  Review of Systems   Constitutional:  Negative for fever.   Respiratory:  Negative for cough and shortness of breath.    Cardiovascular:  Positive for palpitations. Negative for chest pain.   Musculoskeletal:  Positive for myalgias. Negative for falls and neck pain.   Skin:  Negative for rash.   Neurological:  Negative for dizziness, tingling, weakness and headaches.       PAST MEDICAL HISTORY  Patient Active Problem List    Diagnosis Date Noted    Palpitations 07/21/2022       SURGICAL HISTORY   has a past surgical history that includes tubal ligation; tonsillectomy; other orthopedic surgery (Left); and other orthopedic surgery (Right).    ALLERGIES  Allergies   Allergen Reactions    Hydrocodone-Acetaminophen Vomiting and Nausea     VICODIN    Morphine Vomiting and Nausea    Penicillin G Hives and Shortness of Breath     Reaction At AGE 10  Pt. States Was Given For Pneumonia.         CURRENT MEDICATIONS  Home Medications       Reviewed by Gilmar Carson P.A.-C. (Physician Assistant) on 07/05/23 at 0959  Med List Status: <None>     Medication Last Dose Status   albuterol 108 (90 Base) MCG/ACT Aero Soln inhalation aerosol PRN Active   ALBUTEROL INH PRN Active   amLODIPine (NORVASC) 5 MG Tab Taking Active   amLODIPine (NORVASC) 5 MG Tab Not Taking Active   azelastine (OPTIVAR) 0.05 % ophthalmic solution Taking Active   diphenhydrAMINE HCl (BENADRYL ALLERGY PO) Taking Active   fluticasone (FLONASE) 50 MCG/ACT nasal spray Not Taking Active   Fluticasone Propionate, Inhal, 50 MCG/ACT AEROSOL POWDER, BREATH ACTIVATED Taking Active   fluticasone-salmeterol (ADVAIR) 500-50 MCG/ACT AEROSOL POWDER, BREATH ACTIVATED Taking Active   fluticasone-salmeterol (ADVAIR) 500-50 MCG/ACT AEROSOL POWDER, BREATH ACTIVATED Taking Active   FML LIQUIFILM 0.1 % Suspension Taking Active   GARLIC PO Taking Active   Homeopathic Products (CLEAR TINNITUS PO) Taking Active   losartan (COZAAR) 50 MG Tab Taking Active   MAGNESIUM PO Taking Active   meloxicam (MOBIC) 15 MG tablet Not Taking Active   methocarbamol (ROBAXIN) 750 MG Tab Not Taking Active   methylPREDNISolone (MEDROL DOSEPAK) 4 MG Tablet Therapy Pack Not Taking Active   metoprolol SR (TOPROL XL) 25 MG TABLET SR 24 HR Taking Active   Multiple Vitamins-Minerals (MULTIPLE VITAMINS/WOMENS PO) Taking Active   polymixin-trimethoprim (POLYTRIM) 93310-1.1 UNIT/ML-% Solution PRN Active   Sodium Hyaluronate, oral, (HYALURONIC ACID PO) Taking Active   TURMERIC PO Taking Active   VENTOLIN  (90 Base) MCG/ACT Aero Soln inhalation aerosol PRN Active                    SOCIAL HISTORY  Social History     Tobacco Use    Smoking status: Never     Passive exposure: Never    Smokeless tobacco: Never   Vaping Use    Vaping Use: Never used   Substance and Sexual Activity    Alcohol use: Never    Drug use: Never    Sexual activity: Not on file       FAMILY HISTORY  History  "reviewed. No pertinent family history.       Objective:     VITAL SIGNS: /88   Pulse 88   Temp 36.6 °C (97.8 °F) (Temporal)   Resp 15   Ht 1.651 m (5' 5\")   Wt 77.1 kg (170 lb)   SpO2 98%   BMI 28.29 kg/m²     PHYSICAL EXAM  Physical Exam  Constitutional:       Appearance: Normal appearance.   HENT:      Head: Normocephalic and atraumatic.   Eyes:      Conjunctiva/sclera: Conjunctivae normal.   Cardiovascular:      Rate and Rhythm: Normal rate and regular rhythm.      Pulses: Normal pulses.      Heart sounds: Normal heart sounds. No murmur heard.  Pulmonary:      Effort: Pulmonary effort is normal.   Musculoskeletal:      Cervical back: Normal range of motion.      Comments: Left upper extremity: No obvious deformity, discoloration or edema present to the left shoulder or elbow.  Patient has tenderness to palpation over the proximal biceps.  No tenderness over the distal biceps.  Negative hook test.  Maintains full shoulder and elbow range of motion.  Weakly positive speeds.   strength 5/5.  Sensation intact distally.   Neurological:      General: No focal deficit present.      Mental Status: She is alert and oriented to person, place, and time. Mental status is at baseline.         Assessment/Plan:     1. Biceps tendinitis of right upper extremity  - methylPREDNISolone (MEDROL DOSEPAK) 4 MG Tablet Therapy Pack; Follow schedule on package instructions.  Dispense: 21 Tablet; Refill: 0      MDM/Comments:    Patient's findings are consistent with proximal biceps tendinitis.    No preceding injury or trauma.    No indication for x-ray at today's visit.  Trial of oral steroids with ice and gentle stretching encouraged.    Differential diagnosis, natural history, supportive care, and indications for immediate follow-up discussed. All questions answered. Patient agrees with the plan of care.    Follow-up as needed if symptoms worsen or fail to improve to PCP, Urgent care or Emergency Room.    I have " personally reviewed prior external notes and test results pertinent to today's visit.  I have independently reviewed and interpreted all diagnostics ordered during this urgent care acute visit.   Discussed management options (risks,benefits, and alternatives to treatment). Pt expresses understanding and the treatment plan was agreed upon. Questions were encouraged and answered to pt's satisfaction.    Please note that this dictation was created using voice recognition software. I have made a reasonable attempt to correct obvious errors, but I expect that there are errors of grammar and possibly content that I did not discover before finalizing the note.

## 2023-07-31 ENCOUNTER — HOSPITAL ENCOUNTER (OUTPATIENT)
Dept: LAB | Facility: MEDICAL CENTER | Age: 43
End: 2023-07-31
Attending: NURSE PRACTITIONER
Payer: MEDICAID

## 2023-07-31 LAB — EOSINOPHIL # BLD AUTO: 0.7 K/UL (ref 0–0.51)

## 2023-07-31 PROCEDURE — 85004 AUTOMATED DIFF WBC COUNT: CPT

## 2023-07-31 PROCEDURE — 82785 ASSAY OF IGE: CPT

## 2023-07-31 PROCEDURE — 82103 ALPHA-1-ANTITRYPSIN TOTAL: CPT

## 2023-07-31 PROCEDURE — 82104 ALPHA-1-ANTITRYPSIN PHENO: CPT

## 2023-07-31 PROCEDURE — 86003 ALLG SPEC IGE CRUDE XTRC EA: CPT | Mod: 91

## 2023-07-31 PROCEDURE — 36415 COLL VENOUS BLD VENIPUNCTURE: CPT

## 2023-08-02 LAB
A ALTERNATA IGE QN: 0.12 KU/L
A FUMIGATUS IGE QN: 0.25 KU/L
BERMUDA GRASS IGE QN: 0.31 KU/L
BOXELDER IGE QN: 1.06 KU/L
C SPHAEROSPERMUM IGE QN: <0.1 KU/L
CAT DANDER IGE QN: 100 KU/L
CMN PIGWEED IGE QN: 1.73 KU/L
COMMON RAGWEED IGE QN: 0.84 KU/L
COTTONWOOD IGE QN: 0.84 KU/L
D FARINAE IGE QN: 1.97 KU/L
D PTERONYSS IGE QN: 1.9 KU/L
DEPRECATED MISC ALLERGEN IGE RAST QL: NORMAL
DOG DANDER IGE QN: >100 KU/L
IGE SERPL-ACNC: 1343 KU/L
M RACEMOSUS IGE QN: <0.1 KU/L
MOUSE EPITH IGE QN: 24.5 KU/L
MT JUNIPER IGE QN: 0.41 KU/L
MUGWORT IGE QN: 0.3 KU/L
OLIVE POLN IGE QN: 1.01 KU/L
P NOTATUM IGE QN: <0.1 KU/L
ROACH IGE QN: 0.18 KU/L
SALTWORT IGE QN: 5.42 KU/L
TIMOTHY IGE QN: 1.26 KU/L
WHITE ELM IGE QN: 11.6 KU/L
WHITE MULBERRY IGE QN: 10.3 KU/L
WHITE OAK IGE QN: 0.52 KU/L

## 2023-08-03 LAB
A1AT PHENOTYP SERPL-IMP: NORMAL
A1AT SERPL-MCNC: 124 MG/DL (ref 90–200)

## 2023-08-29 ENCOUNTER — HOSPITAL ENCOUNTER (OUTPATIENT)
Dept: LAB | Facility: MEDICAL CENTER | Age: 43
End: 2023-08-29
Attending: STUDENT IN AN ORGANIZED HEALTH CARE EDUCATION/TRAINING PROGRAM
Payer: MEDICAID

## 2023-08-29 LAB
25(OH)D3 SERPL-MCNC: 41 NG/ML (ref 30–100)
BASOPHILS # BLD AUTO: 1.3 % (ref 0–1.8)
BASOPHILS # BLD: 0.07 K/UL (ref 0–0.12)
C3 SERPL-MCNC: 175.6 MG/DL (ref 87–200)
C4 SERPL-MCNC: 28.2 MG/DL (ref 19–52)
EOSINOPHIL # BLD AUTO: 0.63 K/UL (ref 0–0.51)
EOSINOPHIL NFR BLD: 11.8 % (ref 0–6.9)
ERYTHROCYTE [DISTWIDTH] IN BLOOD BY AUTOMATED COUNT: 41.3 FL (ref 35.9–50)
HCT VFR BLD AUTO: 41.5 % (ref 37–47)
HGB BLD-MCNC: 13.9 G/DL (ref 12–16)
IMM GRANULOCYTES # BLD AUTO: 0.01 K/UL (ref 0–0.11)
IMM GRANULOCYTES NFR BLD AUTO: 0.2 % (ref 0–0.9)
LYMPHOCYTES # BLD AUTO: 2.07 K/UL (ref 1–4.8)
LYMPHOCYTES NFR BLD: 38.9 % (ref 22–41)
MCH RBC QN AUTO: 31.8 PG (ref 27–33)
MCHC RBC AUTO-ENTMCNC: 33.5 G/DL (ref 32.2–35.5)
MCV RBC AUTO: 95 FL (ref 81.4–97.8)
MONOCYTES # BLD AUTO: 0.36 K/UL (ref 0–0.85)
MONOCYTES NFR BLD AUTO: 6.8 % (ref 0–13.4)
NEUTROPHILS # BLD AUTO: 2.18 K/UL (ref 1.82–7.42)
NEUTROPHILS NFR BLD: 41 % (ref 44–72)
NRBC # BLD AUTO: 0 K/UL
NRBC BLD-RTO: 0 /100 WBC (ref 0–0.2)
PLATELET # BLD AUTO: 385 K/UL (ref 164–446)
PMV BLD AUTO: 9.4 FL (ref 9–12.9)
RBC # BLD AUTO: 4.37 M/UL (ref 4.2–5.4)
RHEUMATOID FACT SER IA-ACNC: <10 IU/ML (ref 0–14)
WBC # BLD AUTO: 5.3 K/UL (ref 4.8–10.8)

## 2023-08-29 PROCEDURE — 86235 NUCLEAR ANTIGEN ANTIBODY: CPT | Mod: 91

## 2023-08-29 PROCEDURE — 86200 CCP ANTIBODY: CPT

## 2023-08-29 PROCEDURE — 36415 COLL VENOUS BLD VENIPUNCTURE: CPT

## 2023-08-29 PROCEDURE — 83516 IMMUNOASSAY NONANTIBODY: CPT

## 2023-08-29 PROCEDURE — 86160 COMPLEMENT ANTIGEN: CPT

## 2023-08-29 PROCEDURE — 82785 ASSAY OF IGE: CPT

## 2023-08-29 PROCEDURE — 86812 HLA TYPING A B OR C: CPT

## 2023-08-29 PROCEDURE — 86162 COMPLEMENT TOTAL (CH50): CPT

## 2023-08-29 PROCEDURE — 86038 ANTINUCLEAR ANTIBODIES: CPT

## 2023-08-29 PROCEDURE — 86003 ALLG SPEC IGE CRUDE XTRC EA: CPT | Mod: 91

## 2023-08-29 PROCEDURE — 85025 COMPLETE CBC W/AUTO DIFF WBC: CPT

## 2023-08-29 PROCEDURE — 86225 DNA ANTIBODY NATIVE: CPT

## 2023-08-29 PROCEDURE — 82306 VITAMIN D 25 HYDROXY: CPT

## 2023-08-29 PROCEDURE — 86431 RHEUMATOID FACTOR QUANT: CPT

## 2023-08-31 LAB
CCP IGA+IGG SERPL IA-ACNC: 4 UNITS (ref 0–19)
CH50 SERPL-ACNC: 59.1 U/ML (ref 38.7–89.9)
DSDNA AB TITR SER CLIF: NORMAL {TITER}
HLA-B27 QL FC: NORMAL
NUCLEAR IGG SER QL IA: NORMAL

## 2023-09-01 LAB
A ALTERNATA IGE QN: <0.1 KU/L
A FUMIGATUS IGE QN: 0.25 KU/L
ALMOND IGE QN: 0.17 KU/L
ASPARAGUS IGE QN: <0.1 KU/L
AVOCADO IGE QN: 0.15 KU/L
BAKER'S YEAST IGE QN: 0.1 KU/L
BANANA IGE QN: 0.34 KU/L
BASIL IGE QN: <0.1 KU/L
BAYLEAF IGE QN: <0.1 KU/L
BEEF IGE QN: 2.63 KU/L
BEET IGE QN: <0.1 KU/L
BELL PEPPER IGE QN: 0.23 KU/L
BERMUDA GRASS IGE QN: 0.29 KU/L
BLACK PEPPER IGE QN: <0.1 KU/L
BLUE MUSSEL IGE QN: <0.1 KU/L
BLUEBERRY IGE QN: <0.1 KU/L
BOXELDER IGE QN: 0.79 KU/L
BRAZIL NUT IGE QN: <0.1 KU/L
BROCCOLI IGE QN: <0.1 KU/L
BUCKWHEAT IGE QN: 0.34 KU/L
C SPHAEROSPERMUM IGE QN: <0.1 KU/L
CABBAGE IGE QN: 0.15 KU/L
CARROT IGE QN: 0.35 KU/L
CASHEW NUT IGE QN: <0.1 KU/L
CAT DANDER IGE QN: >100 KU/L
CENTROMERE IGG TITR SER IF: 1 AU/ML (ref 0–40)
CHESTNUT IGE QN: 0.62 KU/L
CHICKPEA IGE AB [UNITS/VOLUME] IN SERUM: 0.19 KU/L
CHOCOLATE IGE QN: <0.1 KU/L
CINNAMON IGE QN: <0.1 KU/L
CLAM IGE QN: <0.1 KU/L
CMN PIGWEED IGE QN: 3.99 KU/L
COCONUT IGE QN: 0.14 KU/L
CODFISH IGE QN: <0.1 KU/L
COMMON RAGWEED IGE QN: 0.82 KU/L
COTTONWOOD IGE QN: 1.08 KU/L
COW MILK IGE QN: 3.56 KU/L
CRAB IGE QN: 0.17 KU/L
CUCUMBER IGE QN: 0.47 KU/L
CULTIVATED COTTON IGE QN: <0.1 KU/L
D FARINAE IGE QN: 1.72 KU/L
D PTERONYSS IGE QN: 1.97 KU/L
DEPRECATED MISC ALLERGEN IGE RAST QL: ABNORMAL
DILL IGE QN: <0.1 KU/L
DOG DANDER IGE QN: >100 KU/L
EGG WHITE IGE QN: 1.8 KU/L
ENA SM IGG SER-ACNC: 2 AU/ML (ref 0–40)
ENA SS-B IGG SER IA-ACNC: 1 AU/ML (ref 0–40)
GINGER IGE QN: 0.36 KU/L
GRAPE IGE QN: 0.21 KU/L
HALIBUT IGE QN: <0.1 KU/L
HAZELNUT IGE QN: 1.03 KU/L
IGE SERPL-ACNC: 1343 KU/L
LETTUCE IGE QN: 0.12 KU/L
LIMA BEAN IGE QN: 0.4 KU/L
M RACEMOSUS IGE QN: <0.1 KU/L
MELON IGE QN: 0.12 KU/L
MOUSE EPITH IGE QN: 29.3 KU/L
MT JUNIPER IGE QN: 0.39 KU/L
MUGWORT IGE QN: 0.26 KU/L
OLIVE POLN IGE QN: 1.33 KU/L
ONION IGE QN: 0.51 KU/L
ORANGE IGE QN: <0.1 KU/L
OREGANO IGE QN: <0.1 KU/L
P NOTATUM IGE QN: <0.1 KU/L
PEA IGE QN: <0.1 KU/L
PEANUT IGE QN: 0.37 KU/L
PEAR IGE QN: 0.23 KU/L
PLUM IGE QN: <0.1 KU/L
PORK IGE QN: 26.7 KU/L
POTATO IGE QN: 0.21 KU/L
RASPBERRY IGE QN: <0.1 KU/L
ROACH IGE QN: 0.21 KU/L
SALTWORT IGE QN: 7.16 KU/L
SESAME SEED IGE QN: 1.68 KU/L
SHRIMP IGE QN: 0.26 KU/L
SOYBEAN IGE QN: 0.35 KU/L
SSA52 R0ENA AB IGG Q0420: 4 AU/ML (ref 0–40)
SSA60 R0ENA AB IGG Q0419: 2 AU/ML (ref 0–40)
TEA IGE QN: <0.1 KU/L
TIMOTHY IGE QN: 1.31 KU/L
TOMATO IGE QN: 0.41 KU/L
TROUT IGE QN: 0.89 KU/L
TURKEY MEAT IGE QN: 0.16 KU/L
U1 SNRNP IGG SER QL: 2 UNITS (ref 0–19)
WALNUT IGE QN: 0.11 KU/L
WATERMELON IGE QN: 0.18 KU/L
WHITE ELM IGE QN: 9.1 KU/L
WHITE MULBERRY IGE QN: 9.6 KU/L
WHITE OAK IGE QN: 0.65 KU/L

## 2023-09-29 ENCOUNTER — HOSPITAL ENCOUNTER (OUTPATIENT)
Dept: RADIOLOGY | Facility: MEDICAL CENTER | Age: 43
End: 2023-09-29
Attending: PHYSICIAN ASSISTANT
Payer: MEDICAID

## 2023-09-29 DIAGNOSIS — Z12.31 VISIT FOR SCREENING MAMMOGRAM: ICD-10-CM

## 2023-10-11 ENCOUNTER — HOSPITAL ENCOUNTER (OUTPATIENT)
Dept: RADIOLOGY | Facility: MEDICAL CENTER | Age: 43
End: 2023-10-11
Attending: STUDENT IN AN ORGANIZED HEALTH CARE EDUCATION/TRAINING PROGRAM
Payer: MEDICAID

## 2023-10-11 DIAGNOSIS — R92.8 ABNORMAL RADIOGRAPHIC FINDINGS IN SPECIMEN FROM FEMALE BREAST: ICD-10-CM

## 2023-10-11 PROCEDURE — G0279 TOMOSYNTHESIS, MAMMO: HCPCS

## 2023-10-11 PROCEDURE — 76642 ULTRASOUND BREAST LIMITED: CPT | Mod: LT

## 2023-12-07 ENCOUNTER — HOSPITAL ENCOUNTER (OUTPATIENT)
Dept: LAB | Facility: MEDICAL CENTER | Age: 43
End: 2023-12-07
Attending: STUDENT IN AN ORGANIZED HEALTH CARE EDUCATION/TRAINING PROGRAM
Payer: MEDICAID

## 2023-12-07 LAB
CRP SERPL HS-MCNC: <0.3 MG/DL (ref 0–0.75)
HAV IGM SERPL QL IA: NORMAL
HBV CORE IGM SER QL: NORMAL
HBV SURFACE AG SER QL: NORMAL
HCV AB SER QL: NORMAL
TSH SERPL DL<=0.005 MIU/L-ACNC: 1.37 UIU/ML (ref 0.38–5.33)

## 2023-12-07 PROCEDURE — 86255 FLUORESCENT ANTIBODY SCREEN: CPT

## 2023-12-07 PROCEDURE — 84443 ASSAY THYROID STIM HORMONE: CPT

## 2023-12-07 PROCEDURE — 86140 C-REACTIVE PROTEIN: CPT

## 2023-12-07 PROCEDURE — 83516 IMMUNOASSAY NONANTIBODY: CPT

## 2023-12-07 PROCEDURE — 36415 COLL VENOUS BLD VENIPUNCTURE: CPT

## 2023-12-07 PROCEDURE — 86036 ANCA SCREEN EACH ANTIBODY: CPT

## 2023-12-07 PROCEDURE — 80074 ACUTE HEPATITIS PANEL: CPT

## 2023-12-09 LAB — BM IGG SER QL IF: NEGATIVE

## 2023-12-11 LAB
ANCA IFA PATTERN Q6048: NORMAL
ANCA IFA TITER Q6047: NORMAL
MYELOPEROXIDASE AB SER-ACNC: 0 AU/ML (ref 0–19)
PROTEINASE3 AB SER-ACNC: 13 AU/ML (ref 0–19)

## 2024-01-23 ENCOUNTER — HOSPITAL ENCOUNTER (OUTPATIENT)
Dept: LAB | Facility: MEDICAL CENTER | Age: 44
End: 2024-01-23
Attending: STUDENT IN AN ORGANIZED HEALTH CARE EDUCATION/TRAINING PROGRAM
Payer: COMMERCIAL

## 2024-01-23 ENCOUNTER — HOSPITAL ENCOUNTER (OUTPATIENT)
Dept: LAB | Facility: MEDICAL CENTER | Age: 44
End: 2024-01-23
Payer: COMMERCIAL

## 2024-01-23 LAB
C PARVUM AG STL QL IA.RAPID: NEGATIVE
CORTIS SERPL-MCNC: 12.3 UG/DL (ref 0–23)
ESTRADIOL SERPL-MCNC: 317 PG/ML
FSH SERPL-ACNC: 9.1 MIU/ML
G LAMBLIA AG STL QL IA.RAPID: NEGATIVE
H PYLORI AG STL QL IA: NOT DETECTED
HEMOCCULT STL QL: POSITIVE
LACTOFERRIN STL QL IA: POSITIVE
LH SERPL-ACNC: 9.7 IU/L
PROLACTIN SERPL-MCNC: 18.4 NG/ML (ref 2.8–26)
TESTOST SERPL-MCNC: 9 NG/DL (ref 9–75)

## 2024-01-23 PROCEDURE — 36415 COLL VENOUS BLD VENIPUNCTURE: CPT

## 2024-01-23 PROCEDURE — 82024 ASSAY OF ACTH: CPT

## 2024-01-23 PROCEDURE — 87045 FECES CULTURE AEROBIC BACT: CPT

## 2024-01-23 PROCEDURE — 87046 STOOL CULTR AEROBIC BACT EA: CPT

## 2024-01-23 PROCEDURE — 83002 ASSAY OF GONADOTROPIN (LH): CPT

## 2024-01-23 PROCEDURE — 83630 LACTOFERRIN FECAL (QUAL): CPT

## 2024-01-23 PROCEDURE — 87329 GIARDIA AG IA: CPT

## 2024-01-23 PROCEDURE — 82272 OCCULT BLD FECES 1-3 TESTS: CPT

## 2024-01-23 PROCEDURE — 87328 CRYPTOSPORIDIUM AG IA: CPT

## 2024-01-23 PROCEDURE — 84146 ASSAY OF PROLACTIN: CPT

## 2024-01-23 PROCEDURE — 84305 ASSAY OF SOMATOMEDIN: CPT

## 2024-01-23 PROCEDURE — 83993 ASSAY FOR CALPROTECTIN FECAL: CPT

## 2024-01-23 PROCEDURE — 82670 ASSAY OF TOTAL ESTRADIOL: CPT

## 2024-01-23 PROCEDURE — 83001 ASSAY OF GONADOTROPIN (FSH): CPT

## 2024-01-23 PROCEDURE — 87338 HPYLORI STOOL AG IA: CPT

## 2024-01-23 PROCEDURE — 84403 ASSAY OF TOTAL TESTOSTERONE: CPT

## 2024-01-23 PROCEDURE — 82533 TOTAL CORTISOL: CPT

## 2024-01-25 LAB — ACTH PLAS-MCNC: 13.4 PG/ML (ref 7.2–63.3)

## 2024-01-26 LAB
BACTERIA STL CULT: NORMAL
IGF-I SERPL-MCNC: 145 NG/ML (ref 69–253)
IGF-I Z-SCORE SERPL: 0.1
SIGNIFICANT IND 70042: NORMAL
SITE SITE: NORMAL
SOURCE SOURCE: NORMAL

## 2024-01-27 LAB — CALPROTECTIN STL-MCNT: 1670 UG/G

## 2024-02-16 ENCOUNTER — OFFICE VISIT (OUTPATIENT)
Dept: URGENT CARE | Facility: PHYSICIAN GROUP | Age: 44
End: 2024-02-16

## 2024-02-16 VITALS
WEIGHT: 165 LBS | DIASTOLIC BLOOD PRESSURE: 82 MMHG | SYSTOLIC BLOOD PRESSURE: 120 MMHG | HEART RATE: 88 BPM | RESPIRATION RATE: 14 BRPM | OXYGEN SATURATION: 97 % | BODY MASS INDEX: 27.49 KG/M2 | TEMPERATURE: 97.8 F | HEIGHT: 65 IN

## 2024-02-16 DIAGNOSIS — Z02.4 ENCOUNTER FOR DRIVER'S LICENSE HISTORY AND PHYSICAL: ICD-10-CM

## 2024-02-16 DIAGNOSIS — Z02.89 ENCOUNTER FOR PHYSICAL EXAMINATION RELATED TO EMPLOYMENT: ICD-10-CM

## 2024-02-16 PROCEDURE — 7100 PR DOT PHYSICAL: Performed by: FAMILY MEDICINE

## 2024-02-16 ASSESSMENT — FIBROSIS 4 INDEX: FIB4 SCORE: 0.37

## 2024-02-16 NOTE — PROGRESS NOTES
Chief Complaint:    Chief Complaint   Patient presents with    Employment Physical       History of Present Illness:    Here for DOT exam, non-CDL, for [employer].      Past Medical History:    Past Medical History:   Diagnosis Date    Anesthesia 07/29/2022    pt with strong history of motion sickness    Arrhythmia     Arthritis 07/29/2022    osteo bilateral hips    Asthma 07/29/2022    uses inhalers daily    Endometriosis 09/16/2022    Fatty liver     Gynecological disorder 09/16/2022    Infectious disease 07/29/2022    7/15/22 pt tested positive for covid with sxs.  As of 7/29/22 pt still has sxs of congestion, cough, loss of voice.  Pt tested negative 7/28/22 at home.  Dr. Morales's office notified.    Palpitations     Pneumonia 07/29/2022    AGE 10    PONV (postoperative nausea and vomiting) 07/29/2022    pt with strong history of motion sickness    PONV (postoperative nausea and vomiting) 09/16/2022    Patient states, Severe PONV With Surgeries.     Past Surgical History:    Past Surgical History:   Procedure Laterality Date    OTHER ORTHOPEDIC SURGERY Left     Surgery for dislocated left knee cap.    OTHER ORTHOPEDIC SURGERY Right     Right Shoulder Tendon Surgery.    TONSILLECTOMY      Age 35    TUBAL LIGATION      With IUD Removal, Age 31.     Social History:    Social History     Socioeconomic History    Marital status: Single     Spouse name: Not on file    Number of children: Not on file    Years of education: Not on file    Highest education level: Not on file   Occupational History    Not on file   Tobacco Use    Smoking status: Never     Passive exposure: Never    Smokeless tobacco: Never   Vaping Use    Vaping Use: Never used   Substance and Sexual Activity    Alcohol use: Never    Drug use: Never    Sexual activity: Not on file   Other Topics Concern    Not on file   Social History Narrative    Not on file     Social Determinants of Health     Financial Resource Strain: Not on file   Food Insecurity:  Not on file   Transportation Needs: Not on file   Physical Activity: Not on file   Stress: Not on file   Social Connections: Not on file   Intimate Partner Violence: Not on file   Housing Stability: Not on file     Family History:    No family history on file.    Medications:    Current Outpatient Medications on File Prior to Visit   Medication Sig Dispense Refill    methylPREDNISolone (MEDROL DOSEPAK) 4 MG Tablet Therapy Pack Follow schedule on package instructions. 21 Tablet 0    fluticasone-salmeterol (ADVAIR) 500-50 MCG/ACT AEROSOL POWDER, BREATH ACTIVATED Inhale 1 Puff 2 times a day.      polymixin-trimethoprim (POLYTRIM) 86728-1.1 UNIT/ML-% Solution INSTILL 2 DROPS INTO AFFECTED EYE(S) THREE TIMES DAILY FOR 5 DAYS      losartan (COZAAR) 50 MG Tab       Fluticasone Propionate, Inhal, 50 MCG/ACT AEROSOL POWDER, BREATH ACTIVATED Inhale 1 [place] every day.      FML LIQUIFILM 0.1 % Suspension INSTILL 1 DROP INTO EACH EYE THREE TIMES DAILY      azelastine (OPTIVAR) 0.05 % ophthalmic solution INSTILL 1 DROP INTO EACH EYE TWICE DAILY      albuterol 108 (90 Base) MCG/ACT Aero Soln inhalation aerosol Inhale 1 Puff.      VENTOLIN  (90 Base) MCG/ACT Aero Soln inhalation aerosol INHALE 1 TO 2 PUFFS BY MOUTH EVERY 4 TO 6 HOURS AS NEEDED      diphenhydrAMINE HCl (BENADRYL ALLERGY PO) Take  by mouth.      TURMERIC PO Take  by mouth every day.      Sodium Hyaluronate, oral, (HYALURONIC ACID PO) Take  by mouth every day.      GARLIC PO Take  by mouth every day.      Homeopathic Products (CLEAR TINNITUS PO) Take  by mouth every day.      Multiple Vitamins-Minerals (MULTIPLE VITAMINS/WOMENS PO) Take  by mouth every day.      MAGNESIUM PO Take  by mouth every day.      fluticasone-salmeterol (ADVAIR) 500-50 MCG/ACT AEROSOL POWDER, BREATH ACTIVATED INHALE 1 DOSE BY MOUTH TWICE DAILY FOR 30 DAYS      amLODIPine (NORVASC) 5 MG Tab Take 5 mg by mouth every day.      metoprolol SR (TOPROL XL) 25 MG TABLET SR 24 HR Take 25 mg by  "mouth every day.      ALBUTEROL INH Inhale as needed.       No current facility-administered medications on file prior to visit.     Allergies:    Allergies   Allergen Reactions    Hydrocodone-Acetaminophen Vomiting and Nausea     VICODIN    Morphine Vomiting and Nausea    Penicillin G Hives and Shortness of Breath     Reaction At AGE 10  Pt. States Was Given For Pneumonia.        Vitals:    Vitals:    02/16/24 1124   BP: 120/82   Pulse: 88   Resp: 14   Temp: 36.6 °C (97.8 °F)   TempSrc: Temporal   SpO2: 97%   Weight: 74.8 kg (165 lb)   Height: 1.651 m (5' 5\")     Vision: 20/30 each eye, uncorrected.    Able to recognize traffic light colors.    Able to hear forced whisper in each ear at 5 feet.    Horizontal field of vision: 80 degrees each eye.      Physical Exam:    Constitutional: Vital signs reviewed. Appears well-developed and well-nourished. No acute distress.   Eyes: Sclera white, conjunctivae clear. PERRLA.  ENT: External ears normal. External auditory canals normal without discharge. TMs translucent and non-bulging. Hearing normal. Nasal mucosa pink. Lips/teeth are normal. Oral mucosa pink and moist. Posterior pharynx: WNL.  Neck: Neck supple.   Cardiovascular: Regular rate and rhythm. No murmur. No edema. No varicosities. Peripheral pulses 2+.  Pulmonary/Chest: Respirations non-labored. Clear to auscultation bilaterally.  Abdomen: Bowel sounds are normal active. Soft, non-distended, and non-tender to palpation. No hepatosplenomegaly. No hernia.   Lymph: Cervical nodes without tenderness or enlargement.  Musculoskeletal: Normal gait. Normal range of motion. No tenderness to palpation. No muscular atrophy or weakness.  Neurological: Alert and oriented to person, place, and time. CN 2-12 intact. Muscle tone normal. Coordination normal. Light touch and sensation normal. Reflexes 2+.  Skin: No rashes or lesions. Warm, dry, normal turgor.  Psychiatric: Normal mood and affect. Behavior is normal. Judgment and " thought content normal.       Diagnostics:    U. dip: SG 1.025. Negative for protein, blood, and sugar.      Medical Decision Makin. Encounter for 's license history and physical    2. Encounter for physical examination related to employment        qualified, but periodic monitoring required due to HTN, controlled, on medication.     qualified for 1 year.    Form completed.

## 2024-04-15 ENCOUNTER — APPOINTMENT (OUTPATIENT)
Dept: RADIOLOGY | Facility: MEDICAL CENTER | Age: 44
End: 2024-04-15
Attending: EMERGENCY MEDICINE
Payer: COMMERCIAL

## 2024-04-15 ENCOUNTER — HOSPITAL ENCOUNTER (EMERGENCY)
Facility: MEDICAL CENTER | Age: 44
End: 2024-04-15
Attending: EMERGENCY MEDICINE
Payer: COMMERCIAL

## 2024-04-15 ENCOUNTER — OFFICE VISIT (OUTPATIENT)
Dept: URGENT CARE | Facility: PHYSICIAN GROUP | Age: 44
End: 2024-04-15
Payer: COMMERCIAL

## 2024-04-15 VITALS
BODY MASS INDEX: 26.33 KG/M2 | HEART RATE: 84 BPM | DIASTOLIC BLOOD PRESSURE: 83 MMHG | HEIGHT: 65 IN | TEMPERATURE: 97.1 F | OXYGEN SATURATION: 95 % | WEIGHT: 158 LBS | SYSTOLIC BLOOD PRESSURE: 122 MMHG | RESPIRATION RATE: 16 BRPM

## 2024-04-15 VITALS
HEART RATE: 76 BPM | HEIGHT: 65 IN | BODY MASS INDEX: 26.33 KG/M2 | DIASTOLIC BLOOD PRESSURE: 62 MMHG | OXYGEN SATURATION: 96 % | RESPIRATION RATE: 14 BRPM | SYSTOLIC BLOOD PRESSURE: 118 MMHG | TEMPERATURE: 97 F | WEIGHT: 158 LBS

## 2024-04-15 DIAGNOSIS — K50.10 CROHN'S DISEASE OF LARGE INTESTINE WITHOUT COMPLICATION (HCC): ICD-10-CM

## 2024-04-15 DIAGNOSIS — K50.10 CROHN'S DISEASE OF COLON WITHOUT COMPLICATION (HCC): ICD-10-CM

## 2024-04-15 DIAGNOSIS — R10.9 ACUTE ABDOMINAL PAIN: ICD-10-CM

## 2024-04-15 DIAGNOSIS — R10.2 PELVIC PAIN: ICD-10-CM

## 2024-04-15 LAB
ALBUMIN SERPL BCP-MCNC: 5 G/DL (ref 3.2–4.9)
ALBUMIN/GLOB SERPL: 1.7 G/DL
ALP SERPL-CCNC: 64 U/L (ref 30–99)
ALT SERPL-CCNC: 22 U/L (ref 2–50)
ANION GAP SERPL CALC-SCNC: 16 MMOL/L (ref 7–16)
APPEARANCE UR: ABNORMAL
AST SERPL-CCNC: 17 U/L (ref 12–45)
BACTERIA #/AREA URNS HPF: ABNORMAL /HPF
BASOPHILS # BLD AUTO: 0.6 % (ref 0–1.8)
BASOPHILS # BLD: 0.03 K/UL (ref 0–0.12)
BILIRUB SERPL-MCNC: 0.3 MG/DL (ref 0.1–1.5)
BILIRUB UR QL STRIP.AUTO: NEGATIVE
BUN SERPL-MCNC: 10 MG/DL (ref 8–22)
CALCIUM ALBUM COR SERPL-MCNC: 9.1 MG/DL (ref 8.5–10.5)
CALCIUM SERPL-MCNC: 9.9 MG/DL (ref 8.5–10.5)
CHLORIDE SERPL-SCNC: 99 MMOL/L (ref 96–112)
CO2 SERPL-SCNC: 22 MMOL/L (ref 20–33)
COLOR UR: YELLOW
CREAT SERPL-MCNC: 0.67 MG/DL (ref 0.5–1.4)
EOSINOPHIL # BLD AUTO: 0.47 K/UL (ref 0–0.51)
EOSINOPHIL NFR BLD: 9.8 % (ref 0–6.9)
EPI CELLS #/AREA URNS HPF: ABNORMAL /HPF
ERYTHROCYTE [DISTWIDTH] IN BLOOD BY AUTOMATED COUNT: 42.5 FL (ref 35.9–50)
GFR SERPLBLD CREATININE-BSD FMLA CKD-EPI: 110 ML/MIN/1.73 M 2
GLOBULIN SER CALC-MCNC: 3 G/DL (ref 1.9–3.5)
GLUCOSE SERPL-MCNC: 89 MG/DL (ref 65–99)
GLUCOSE UR STRIP.AUTO-MCNC: NEGATIVE MG/DL
HCG SERPL QL: NEGATIVE
HCT VFR BLD AUTO: 40.4 % (ref 37–47)
HGB BLD-MCNC: 14.1 G/DL (ref 12–16)
HYALINE CASTS #/AREA URNS LPF: ABNORMAL /LPF
IMM GRANULOCYTES # BLD AUTO: 0.01 K/UL (ref 0–0.11)
IMM GRANULOCYTES NFR BLD AUTO: 0.2 % (ref 0–0.9)
KETONES UR STRIP.AUTO-MCNC: NEGATIVE MG/DL
LEUKOCYTE ESTERASE UR QL STRIP.AUTO: NEGATIVE
LIPASE SERPL-CCNC: 31 U/L (ref 11–82)
LYMPHOCYTES # BLD AUTO: 2.2 K/UL (ref 1–4.8)
LYMPHOCYTES NFR BLD: 45.8 % (ref 22–41)
MCH RBC QN AUTO: 32.8 PG (ref 27–33)
MCHC RBC AUTO-ENTMCNC: 34.9 G/DL (ref 32.2–35.5)
MCV RBC AUTO: 94 FL (ref 81.4–97.8)
MICRO URNS: ABNORMAL
MONOCYTES # BLD AUTO: 0.55 K/UL (ref 0–0.85)
MONOCYTES NFR BLD AUTO: 11.5 % (ref 0–13.4)
NEUTROPHILS # BLD AUTO: 1.54 K/UL (ref 1.82–7.42)
NEUTROPHILS NFR BLD: 32.1 % (ref 44–72)
NITRITE UR QL STRIP.AUTO: NEGATIVE
NRBC # BLD AUTO: 0 K/UL
NRBC BLD-RTO: 0 /100 WBC (ref 0–0.2)
PH UR STRIP.AUTO: 7 [PH] (ref 5–8)
PLATELET # BLD AUTO: 309 K/UL (ref 164–446)
PMV BLD AUTO: 8.8 FL (ref 9–12.9)
POTASSIUM SERPL-SCNC: 3.8 MMOL/L (ref 3.6–5.5)
PROT SERPL-MCNC: 8 G/DL (ref 6–8.2)
PROT UR QL STRIP: NEGATIVE MG/DL
RBC # BLD AUTO: 4.3 M/UL (ref 4.2–5.4)
RBC # URNS HPF: ABNORMAL /HPF
RBC UR QL AUTO: NEGATIVE
SODIUM SERPL-SCNC: 137 MMOL/L (ref 135–145)
SP GR UR STRIP.AUTO: 1.02
UROBILINOGEN UR STRIP.AUTO-MCNC: 0.2 MG/DL
WBC # BLD AUTO: 4.8 K/UL (ref 4.8–10.8)
WBC #/AREA URNS HPF: ABNORMAL /HPF

## 2024-04-15 PROCEDURE — 99214 OFFICE O/P EST MOD 30 MIN: CPT | Performed by: NURSE PRACTITIONER

## 2024-04-15 PROCEDURE — 36415 COLL VENOUS BLD VENIPUNCTURE: CPT

## 2024-04-15 PROCEDURE — 3074F SYST BP LT 130 MM HG: CPT | Performed by: NURSE PRACTITIONER

## 2024-04-15 PROCEDURE — 83690 ASSAY OF LIPASE: CPT

## 2024-04-15 PROCEDURE — 84703 CHORIONIC GONADOTROPIN ASSAY: CPT

## 2024-04-15 PROCEDURE — 80053 COMPREHEN METABOLIC PANEL: CPT

## 2024-04-15 PROCEDURE — 99284 EMERGENCY DEPT VISIT MOD MDM: CPT

## 2024-04-15 PROCEDURE — 85025 COMPLETE CBC W/AUTO DIFF WBC: CPT

## 2024-04-15 PROCEDURE — 3078F DIAST BP <80 MM HG: CPT | Performed by: NURSE PRACTITIONER

## 2024-04-15 PROCEDURE — 74177 CT ABD & PELVIS W/CONTRAST: CPT

## 2024-04-15 PROCEDURE — 700117 HCHG RX CONTRAST REV CODE 255: Mod: UD | Performed by: EMERGENCY MEDICINE

## 2024-04-15 PROCEDURE — 81001 URINALYSIS AUTO W/SCOPE: CPT

## 2024-04-15 RX ORDER — PREDNISONE 20 MG/1
TABLET ORAL
Qty: 10 TABLET | Refills: 0 | Status: SHIPPED | OUTPATIENT
Start: 2024-04-15

## 2024-04-15 RX ORDER — DICYCLOMINE HCL 20 MG
20 TABLET ORAL EVERY 6 HOURS
Qty: 40 TABLET | Refills: 0 | Status: SHIPPED | OUTPATIENT
Start: 2024-04-15 | End: 2024-04-25

## 2024-04-15 RX ORDER — FOLIC ACID 1 MG/1
1 TABLET ORAL DAILY
COMMUNITY
Start: 2024-04-06

## 2024-04-15 RX ORDER — SULFASALAZINE 500 MG/1
TABLET ORAL
COMMUNITY
Start: 2024-03-12

## 2024-04-15 RX ADMIN — IOHEXOL 25 ML: 240 INJECTION, SOLUTION INTRATHECAL; INTRAVASCULAR; INTRAVENOUS; ORAL at 16:00

## 2024-04-15 RX ADMIN — IOHEXOL 100 ML: 350 INJECTION, SOLUTION INTRAVENOUS at 16:59

## 2024-04-15 ASSESSMENT — ENCOUNTER SYMPTOMS
FEVER: 0
ABDOMINAL PAIN: 1
CONSTIPATION: 1
MUSCULOSKELETAL NEGATIVE: 1
BLOOD IN STOOL: 0
CHILLS: 0

## 2024-04-15 ASSESSMENT — FIBROSIS 4 INDEX
FIB4 SCORE: 0.38
FIB4 SCORE: 0.38

## 2024-04-15 NOTE — ED TRIAGE NOTES
"Chief Complaint   Patient presents with    Pelvic Pain     Pt reports pelvic and rectal pain x 1 month     PT reports colonoscopy on 4/12, diagnosed with Crohn's Disease, reports she has been having pelvic and rectal pain for a month with no relief, UC sent her over for possible abscess or fistula formation, patient denies and issues with urination pt denies recent flare ups of Crohn's. Pt reports 6/10 abdominal/pelvic pain. ABD protocol ordered, patient given cup. Ambulatory from triage, Ax0x4. Educated on triage process and wait times.     /85   Pulse 85   Temp 36.5 °C (97.7 °F) (Temporal)   Resp 18   Ht 1.651 m (5' 5\")   Wt 71.7 kg (158 lb)   LMP 08/03/2022 (Within Days)   SpO2 98%   BMI 26.29 kg/m²     "

## 2024-04-15 NOTE — ED NOTES
PIV inserted. Blood drawn and sent to lab.  Pt tolerated well.  Pt states she already gave a urine sample in the lobby. ERP at bedside.

## 2024-04-15 NOTE — ED PROVIDER NOTES
ER Provider Note    Scribed for Checo Horn MD by Cristine Arboleda. 4/15/2024  3:07 PM    Primary Care Provider: Abdon Sanchez II, M.D.    CHIEF COMPLAINT   Chief Complaint   Patient presents with    Pelvic Pain     Pt reports pelvic and rectal pain x 1 month         HPI/ROS  LIMITATION TO HISTORY   Select: : None  OUTSIDE HISTORIAN(S):  Parent Mother is present at bedside but did not provide any significant information.    Gina Little is a 44 y.o. female who presents to the ED complaining of abdominal pain onset one month ago. Patient was diagnosed with Crohn's Disease one month ago. Patient went to [facility] in [place] because she was having diarrhea but states that the doctor did not do any imaging and discharged her. She then saw another doctor who told her to take Milk of Magnesium with minimal alleviation. Patient reported to Urgent Care who referred her to the ED due to an abscess that they found in her imaging. She states that her pain radiates to her rectum. The pain is constant and localized to the suprapubic area of her abdomen. Denies fever, rash, vaginal discharge, or rectal fistula. Her pain in her abdomen is exacerbated when she sneezes and coughs. She had a colonoscopy one month ago with [facility] and has a follow-up appointment in May. Patient denies recent injury or medication changes. Denies alcohol or drug use. She had a hysterectomy in September last year. Patient has not seen her gynecologist since her hysterectomy.    PAST MEDICAL HISTORY  Past Medical History:   Diagnosis Date    Anesthesia 07/29/2022    pt with strong history of motion sickness    Arrhythmia     Arthritis 07/29/2022    osteo bilateral hips    Asthma 07/29/2022    uses inhalers daily    Crohn's disease (HCC)     Endometriosis 09/16/2022    Fatty liver     Gynecological disorder 09/16/2022    Infectious disease 07/29/2022    7/15/22 pt tested positive for covid with sxs.  As of 7/29/22 pt still has sxs  of congestion, cough, loss of voice.  Pt tested negative 7/28/22 at home.  Dr. Morales's office notified.    Palpitations     Pneumonia 07/29/2022    AGE 10    PONV (postoperative nausea and vomiting) 07/29/2022    pt with strong history of motion sickness    PONV (postoperative nausea and vomiting) 09/16/2022    Patient states, Severe PONV With Surgeries.     SURGICAL HISTORY  Past Surgical History:   Procedure Laterality Date    OTHER ORTHOPEDIC SURGERY Left     Surgery for dislocated left knee cap.    OTHER ORTHOPEDIC SURGERY Right     Right Shoulder Tendon Surgery.    TONSILLECTOMY      Age 35    TUBAL LIGATION      With IUD Removal, Age 31.   Hysterectomy in September    FAMILY HISTORY  No family history noted.    SOCIAL HISTORY   reports that she has never smoked. She has never been exposed to tobacco smoke. She has never used smokeless tobacco. She reports that she does not drink alcohol and does not use drugs.    CURRENT MEDICATIONS  Previous Medications    ALBUTEROL 108 (90 BASE) MCG/ACT AERO SOLN INHALATION AEROSOL    Inhale 1 Puff.    ALBUTEROL INH    Inhale as needed.    AMLODIPINE (NORVASC) 5 MG TAB    Take 5 mg by mouth every day.    DIPHENHYDRAMINE HCL (BENADRYL ALLERGY PO)    Take  by mouth.    FLUTICASONE PROPIONATE, INHAL, 50 MCG/ACT AEROSOL POWDER, BREATH ACTIVATED    Inhale 1 [place] every day.    FLUTICASONE-SALMETEROL (ADVAIR) 500-50 MCG/ACT AEROSOL POWDER, BREATH ACTIVATED    INHALE 1 DOSE BY MOUTH TWICE DAILY FOR 30 DAYS    FOLIC ACID (FOLVITE) 1 MG TAB    Take 1 mg by mouth every day.    HOMEOPATHIC PRODUCTS (CLEAR TINNITUS PO)    Take  by mouth every day.    LOSARTAN (COZAAR) 50 MG TAB        MAGNESIUM PO    Take  by mouth every day.    METOPROLOL SR (TOPROL XL) 25 MG TABLET SR 24 HR    Take 25 mg by mouth every day.    MULTIPLE VITAMINS-MINERALS (MULTIPLE VITAMINS/WOMENS PO)    Take  by mouth every day.    SODIUM HYALURONATE, ORAL, (HYALURONIC ACID PO)    Take  by mouth every day.     "SULFASALAZINE (AZULFIDINE) 500 MG TAB        TURMERIC PO    Take  by mouth every day.    VENTOLIN  (90 BASE) MCG/ACT AERO SOLN INHALATION AEROSOL    INHALE 1 TO 2 PUFFS BY MOUTH EVERY 4 TO 6 HOURS AS NEEDED     ALLERGIES  Hydrocodone-acetaminophen, Morphine, and Penicillin g    PHYSICAL EXAM  /85   Pulse 85   Temp 36.5 °C (97.7 °F) (Temporal)   Resp 18   Ht 1.651 m (5' 5\")   Wt 71.7 kg (158 lb)   LMP 08/03/2022 (Within Days)   SpO2 98%   BMI 26.29 kg/m²       Constitutional: Alert in no apparent distress.  HENT: No signs of trauma, Bilateral external ears normal, Nose normal. Uvula midline.   Eyes: Pupils are equal and reactive, Conjunctiva normal, Non-icteric.   Neck: Normal range of motion, No tenderness, Supple, No stridor.   Lymphatic: No lymphadenopathy noted.   Cardiovascular: Regular rate and rhythm, no murmurs.   Thorax & Lungs: Normal breath sounds, No respiratory distress, No wheezing, No chest tenderness.   Abdomen: Bowel sounds normal, Soft, No peritoneal signs, No masses, No pulsatile masses. Diffuse lower abdominal tenderness.  Skin: Warm, Dry, No erythema, No rash.   Back: No bony tenderness, No CVA tenderness.   Extremities: Intact distal pulses, No edema, No tenderness, No cyanosis.  Musculoskeletal: Good range of motion in all major joints. No tenderness to palpation or major deformities noted.   Neurologic: Alert , Normal motor function, Normal sensory function, No focal deficits noted.   Psychiatric: Affect normal, Judgment normal, Mood normal.     DIAGNOSTIC STUDIES    LABS    Results for orders placed or performed during the hospital encounter of 04/15/24   CBC with Differential   Result Value Ref Range    WBC 4.8 4.8 - 10.8 K/uL    RBC 4.30 4.20 - 5.40 M/uL    Hemoglobin 14.1 12.0 - 16.0 g/dL    Hematocrit 40.4 37.0 - 47.0 %    MCV 94.0 81.4 - 97.8 fL    MCH 32.8 27.0 - 33.0 pg    MCHC 34.9 32.2 - 35.5 g/dL    RDW 42.5 35.9 - 50.0 fL    Platelet Count 309 164 - 446 K/uL    " MPV 8.8 (L) 9.0 - 12.9 fL    Neutrophils-Polys 32.10 (L) 44.00 - 72.00 %    Lymphocytes 45.80 (H) 22.00 - 41.00 %    Monocytes 11.50 0.00 - 13.40 %    Eosinophils 9.80 (H) 0.00 - 6.90 %    Basophils 0.60 0.00 - 1.80 %    Immature Granulocytes 0.20 0.00 - 0.90 %    Nucleated RBC 0.00 0.00 - 0.20 /100 WBC    Neutrophils (Absolute) 1.54 (L) 1.82 - 7.42 K/uL    Lymphs (Absolute) 2.20 1.00 - 4.80 K/uL    Monos (Absolute) 0.55 0.00 - 0.85 K/uL    Eos (Absolute) 0.47 0.00 - 0.51 K/uL    Baso (Absolute) 0.03 0.00 - 0.12 K/uL    Immature Granulocytes (abs) 0.01 0.00 - 0.11 K/uL    NRBC (Absolute) 0.00 K/uL   Complete Metabolic Panel   Result Value Ref Range    Sodium 137 135 - 145 mmol/L    Potassium 3.8 3.6 - 5.5 mmol/L    Chloride 99 96 - 112 mmol/L    Co2 22 20 - 33 mmol/L    Anion Gap 16.0 7.0 - 16.0    Glucose 89 65 - 99 mg/dL    Bun 10 8 - 22 mg/dL    Creatinine 0.67 0.50 - 1.40 mg/dL    Calcium 9.9 8.5 - 10.5 mg/dL    Correct Calcium 9.1 8.5 - 10.5 mg/dL    AST(SGOT) 17 12 - 45 U/L    ALT(SGPT) 22 2 - 50 U/L    Alkaline Phosphatase 64 30 - 99 U/L    Total Bilirubin 0.3 0.1 - 1.5 mg/dL    Albumin 5.0 (H) 3.2 - 4.9 g/dL    Total Protein 8.0 6.0 - 8.2 g/dL    Globulin 3.0 1.9 - 3.5 g/dL    A-G Ratio 1.7 g/dL   Lipase   Result Value Ref Range    Lipase 31 11 - 82 U/L   Urinalysis    Specimen: Urine   Result Value Ref Range    Color Yellow     Character Cloudy (A)     Specific Gravity 1.017 <1.035    Ph 7.0 5.0 - 8.0    Glucose Negative Negative mg/dL    Ketones Negative Negative mg/dL    Protein Negative Negative mg/dL    Bilirubin Negative Negative    Urobilinogen, Urine 0.2 Negative    Nitrite Negative Negative    Leukocyte Esterase Negative Negative    Occult Blood Negative Negative    Micro Urine Req Microscopic    URINE MICROSCOPIC (W/UA)   Result Value Ref Range    WBC 0-2 /hpf    RBC 0-2 /hpf    Bacteria Moderate (A) None /hpf    Epithelial Cells Few /hpf    Hyaline Cast 3-5 (A) /lpf   BETA-HCG QUALITATIVE SERUM    Result Value Ref Range    Beta-Hcg Qualitative Serum Negative Negative   ESTIMATED GFR   Result Value Ref Range    GFR (CKD-EPI) 110 >60 mL/min/1.73 m 2     RADIOLOGY/PROCEDURES   The attending emergency physician has independently interpreted the diagnostic imaging associated with this visit and am waiting the final reading from the radiologist.   My preliminary interpretation is a follows: no free air    Radiologist interpretation:  CT-ABDOMEN-PELVIS WITH   Final Result         1. Hepatomegaly with fatty infiltration of the liver.   2. No evidence for obstructive uropathy.   3. No evidence for bowel obstruction, diverticulosis, or appendicitis.        COURSE & MEDICAL DECISION MAKING     ASSESSMENT, COURSE AND PLAN  Care Narrative:     3:38 PM - Patient seen and examined at bedside for abdominal pain. Discussed plan of care, including labs and imaging. Patient agrees to the plan of care. Ordered for CBC w/ Diff, CMP, Lipase, UA, Beta-HCG Qualitative Serum, Urine Microscopic, and CT-Abdomen-Pelvis With to evaluate her symptoms.     5:22 PM - I reevaluated the patient at bedside. I informed the patient that her CT-scans showed hepatomegaly but no significant abnormalities that require care in the ED. I discussed plan for discharge and follow up as outlined below. The patient is stable for discharge at this time and will return for any new or worsening symptoms. Patient verbalizes understanding and support with my plan for discharge.     #Abdominal pain  CT scan with no perforation  No abscess noted  Patient wishes to defer pelvic exam until GYN follow-up which she agrees to obtain and denies any new or different vaginal discharge  No dysuria and no evidence of UTI  Patient agrees to follow-up with GI regarding concerns for worsening Crohn's    DISPOSITION AND DISCUSSIONS  I have discussed management of the patient with the following physicians and NAUN's:  None    Discussion of management with other QHP or  appropriate source(s): None     Escalation of care considered, and ultimately not performed: acute inpatient care management, however at this time, the patient is most appropriate for outpatient management.    Barriers to care at this time, including but not limited to:  None .     The patient will return for new or worsening symptoms and is stable at the time of discharge.    The patient is referred to a primary physician for blood pressure management, diabetic screening, and for all other preventative health concerns.    DISPOSITION:  Patient will be discharged home in stable condition.    FOLLOW UP:  Abdon Sanchez II, M.D.  [address] [facility] [id number] [phone]    In 3 days      [facility], Emergency Dept  [address] [facility] [address] [phone]    If symptoms worsen    [facility]  [address] [place] [id number] [phone]    call to schedule GI follow up    FINAL DIAGNOSIS  1. Acute abdominal pain      Cristine BISHOP (Kacey), am scribing for, and in the presence of, Checo Horn M.D..    Electronically signed by: Cristine Arboleda (Kacey), 4/15/2024    ICheco M.D. personally performed the services described in this documentation, as scribed by Cristine Arboleda in my presence, and it is both accurate and complete.    The note accurately reflects work and decisions made by me.  Checo Horn M.D.  4/15/2024  8:45 PM

## 2024-04-15 NOTE — PROGRESS NOTES
Subjective     Gina Little is a 44 y.o. female who presents with Pelvic Pain (Constant pelvic pain, pressure internal, goes to rectum, has appt 5/16 to see specialist for chron's disease, had hysterectomy 9/14, was seen at [facility] 3 weeks ago, )            HPI  New problem.  Patient is a 44-year-old female who presents with constant pelvic pain, and pressure that she rates as a 6 out of 10 for the past 3 weeks.  She was seen at [facility] approximately 3 weeks ago and was told this was related to constipation and given medication for that.  Her colonoscopy report which she has on her phone states that she does have Crohn's disease with possible crypt abscess in the final report.  She denies fever, chills, nausea but does report constipation issues.  She cannot get into her GI until May 16.  She denies any blood in her stool.    Hydrocodone-acetaminophen, Morphine, and Penicillin g  Current Outpatient Medications on File Prior to Visit   Medication Sig Dispense Refill    folic acid (FOLVITE) 1 MG Tab Take 1 mg by mouth every day.      sulfaSALAzine (AZULFIDINE) 500 MG Tab       albuterol 108 (90 Base) MCG/ACT Aero Soln inhalation aerosol Inhale 1 Puff.      VENTOLIN  (90 Base) MCG/ACT Aero Soln inhalation aerosol INHALE 1 TO 2 PUFFS BY MOUTH EVERY 4 TO 6 HOURS AS NEEDED      diphenhydrAMINE HCl (BENADRYL ALLERGY PO) Take  by mouth.      TURMERIC PO Take  by mouth every day.      Multiple Vitamins-Minerals (MULTIPLE VITAMINS/WOMENS PO) Take  by mouth every day.      fluticasone-salmeterol (ADVAIR) 500-50 MCG/ACT AEROSOL POWDER, BREATH ACTIVATED INHALE 1 DOSE BY MOUTH TWICE DAILY FOR 30 DAYS      amLODIPine (NORVASC) 5 MG Tab Take 5 mg by mouth every day.      metoprolol SR (TOPROL XL) 25 MG TABLET SR 24 HR Take 25 mg by mouth every day.      losartan (COZAAR) 50 MG Tab       Fluticasone Propionate, Inhal, 50 MCG/ACT AEROSOL POWDER, BREATH ACTIVATED Inhale 1 [place] every day. (Patient not taking:  "Reported on 4/15/2024)      Sodium Hyaluronate, oral, (HYALURONIC ACID PO) Take  by mouth every day. (Patient not taking: Reported on 4/15/2024)      Homeopathic Products (CLEAR TINNITUS PO) Take  by mouth every day. (Patient not taking: Reported on 4/15/2024)      MAGNESIUM PO Take  by mouth every day. (Patient not taking: Reported on 4/15/2024)      ALBUTEROL INH Inhale as needed. (Patient not taking: Reported on 4/15/2024)       No current facility-administered medications on file prior to visit.     Social History     Socioeconomic History    Marital status: Single     Spouse name: Not on file    Number of children: Not on file    Years of education: Not on file    Highest education level: Not on file   Occupational History    Not on file   Tobacco Use    Smoking status: Never     Passive exposure: Never    Smokeless tobacco: Never   Vaping Use    Vaping Use: Never used   Substance and Sexual Activity    Alcohol use: Never    Drug use: Never    Sexual activity: Not on file   Other Topics Concern    Not on file   Social History Narrative    Not on file     Social Determinants of Health     Financial Resource Strain: Not on file   Food Insecurity: Not on file   Transportation Needs: Not on file   Physical Activity: Not on file   Stress: Not on file   Social Connections: Not on file   Intimate Partner Violence: Not on file   Housing Stability: Not on file     Breast Cancer-related family history is not on file.      Review of Systems   Constitutional:  Negative for chills, fever and malaise/fatigue.   Gastrointestinal:  Positive for abdominal pain and constipation. Negative for blood in stool and melena.   Genitourinary: Negative.    Musculoskeletal: Negative.               Objective     /62   Pulse 76   Temp 36.1 °C (97 °F) (Temporal)   Resp 14   Ht 1.651 m (5' 5\")   Wt 71.7 kg (158 lb)   LMP 08/03/2022 (Within Days)   SpO2 96%   BMI 26.29 kg/m²      Physical Exam  Constitutional:       Appearance: " Normal appearance. She is not ill-appearing.   Cardiovascular:      Rate and Rhythm: Normal rate and regular rhythm.      Heart sounds: No murmur heard.  Pulmonary:      Effort: Pulmonary effort is normal.      Breath sounds: Normal breath sounds.   Abdominal:      General: Abdomen is flat.      Palpations: Abdomen is soft.   Musculoskeletal:         General: Normal range of motion.   Skin:     General: Skin is warm.   Neurological:      General: No focal deficit present.      Mental Status: She is alert and oriented to person, place, and time.                             Assessment & Plan        1. Pelvic pain        2. Crohn's disease of colon without complication (HCC)          Colonoscopy report states crypt abscess.   She has constant 6/10 abdominal pain and no GI appt until May.  To [facility] for further evaluation.   VSS for POV transport.

## 2024-04-16 NOTE — DISCHARGE INSTRUCTIONS
Please discuss the following findings with your regular doctor:  CT-ABDOMEN-PELVIS WITH   Final Result         1. Hepatomegaly with fatty infiltration of the liver.   2. No evidence for obstructive uropathy.   3. No evidence for bowel obstruction, diverticulosis, or appendicitis.        Labs Reviewed   CBC WITH DIFFERENTIAL - Abnormal; Notable for the following components:       Result Value    MPV 8.8 (*)     Neutrophils-Polys 32.10 (*)     Lymphocytes 45.80 (*)     Eosinophils 9.80 (*)     Neutrophils (Absolute) 1.54 (*)     All other components within normal limits   COMP METABOLIC PANEL - Abnormal; Notable for the following components:    Albumin 5.0 (*)     All other components within normal limits   URINALYSIS - Abnormal; Notable for the following components:    Character Cloudy (*)     All other components within normal limits   URINE MICROSCOPIC (W/UA) - Abnormal; Notable for the following components:    Bacteria Moderate (*)     Hyaline Cast 3-5 (*)     All other components within normal limits   LIPASE   HCG QUAL SERUM   ESTIMATED GFR

## 2024-05-01 ENCOUNTER — APPOINTMENT (OUTPATIENT)
Dept: URGENT CARE | Facility: PHYSICIAN GROUP | Age: 44
End: 2024-05-01
Payer: MEDICAID

## 2024-05-21 ENCOUNTER — HOSPITAL ENCOUNTER (OUTPATIENT)
Dept: LAB | Facility: MEDICAL CENTER | Age: 44
End: 2024-05-21
Attending: STUDENT IN AN ORGANIZED HEALTH CARE EDUCATION/TRAINING PROGRAM
Payer: MEDICAID

## 2024-05-21 LAB
ALBUMIN SERPL BCP-MCNC: 4.8 G/DL (ref 3.2–4.9)
ALBUMIN/GLOB SERPL: 1.7 G/DL
ALP SERPL-CCNC: 65 U/L (ref 30–99)
ALT SERPL-CCNC: 34 U/L (ref 2–50)
ANION GAP SERPL CALC-SCNC: 12 MMOL/L (ref 7–16)
AST SERPL-CCNC: 24 U/L (ref 12–45)
BASOPHILS # BLD AUTO: 1.1 % (ref 0–1.8)
BASOPHILS # BLD: 0.05 K/UL (ref 0–0.12)
BILIRUB SERPL-MCNC: 0.5 MG/DL (ref 0.1–1.5)
BUN SERPL-MCNC: 12 MG/DL (ref 8–22)
CALCIUM ALBUM COR SERPL-MCNC: 9 MG/DL (ref 8.5–10.5)
CALCIUM SERPL-MCNC: 9.6 MG/DL (ref 8.5–10.5)
CHLORIDE SERPL-SCNC: 103 MMOL/L (ref 96–112)
CO2 SERPL-SCNC: 23 MMOL/L (ref 20–33)
CREAT SERPL-MCNC: 0.75 MG/DL (ref 0.5–1.4)
CRP SERPL HS-MCNC: <0.3 MG/DL (ref 0–0.75)
EOSINOPHIL # BLD AUTO: 0.57 K/UL (ref 0–0.51)
EOSINOPHIL NFR BLD: 12.8 % (ref 0–6.9)
ERYTHROCYTE [DISTWIDTH] IN BLOOD BY AUTOMATED COUNT: 44.7 FL (ref 35.9–50)
GFR SERPLBLD CREATININE-BSD FMLA CKD-EPI: 100 ML/MIN/1.73 M 2
GLOBULIN SER CALC-MCNC: 2.8 G/DL (ref 1.9–3.5)
GLUCOSE SERPL-MCNC: 94 MG/DL (ref 65–99)
HCT VFR BLD AUTO: 41.9 % (ref 37–47)
HGB BLD-MCNC: 14.1 G/DL (ref 12–16)
IMM GRANULOCYTES # BLD AUTO: 0 K/UL (ref 0–0.11)
IMM GRANULOCYTES NFR BLD AUTO: 0 % (ref 0–0.9)
LYMPHOCYTES # BLD AUTO: 2.08 K/UL (ref 1–4.8)
LYMPHOCYTES NFR BLD: 46.5 % (ref 22–41)
MCH RBC QN AUTO: 33.2 PG (ref 27–33)
MCHC RBC AUTO-ENTMCNC: 33.7 G/DL (ref 32.2–35.5)
MCV RBC AUTO: 98.6 FL (ref 81.4–97.8)
MONOCYTES # BLD AUTO: 0.35 K/UL (ref 0–0.85)
MONOCYTES NFR BLD AUTO: 7.8 % (ref 0–13.4)
NEUTROPHILS # BLD AUTO: 1.42 K/UL (ref 1.82–7.42)
NEUTROPHILS NFR BLD: 31.8 % (ref 44–72)
NRBC # BLD AUTO: 0 K/UL
NRBC BLD-RTO: 0 /100 WBC (ref 0–0.2)
PLATELET # BLD AUTO: 364 K/UL (ref 164–446)
PMV BLD AUTO: 9.1 FL (ref 9–12.9)
POTASSIUM SERPL-SCNC: 3.6 MMOL/L (ref 3.6–5.5)
PROT SERPL-MCNC: 7.6 G/DL (ref 6–8.2)
RBC # BLD AUTO: 4.25 M/UL (ref 4.2–5.4)
SODIUM SERPL-SCNC: 138 MMOL/L (ref 135–145)
WBC # BLD AUTO: 4.5 K/UL (ref 4.8–10.8)

## 2024-05-22 LAB
GAMMA INTERFERON BACKGROUND BLD IA-ACNC: 0.04 IU/ML
HBV SURFACE AB SERPL IA-ACNC: 307 MIU/ML (ref 0–10)
HBV SURFACE AG SER QL: NORMAL
M TB IFN-G BLD-IMP: NEGATIVE
M TB IFN-G CD4+ BCKGRND COR BLD-ACNC: 0.03 IU/ML
MITOGEN IGNF BCKGRD COR BLD-ACNC: >10 IU/ML
QFT TB2 - NIL TBQ2: 0 IU/ML

## 2024-05-23 LAB — CALPROTECTIN STL-MCNT: 80 UG/G

## 2024-05-24 LAB — TPMT BLD-CCNC: 24.9 U/ML (ref 24–44)

## 2024-06-10 ENCOUNTER — HOSPITAL ENCOUNTER (OUTPATIENT)
Dept: RADIOLOGY | Facility: MEDICAL CENTER | Age: 44
End: 2024-06-10
Payer: COMMERCIAL

## 2024-06-10 DIAGNOSIS — R19.4 ALTERED BOWEL HABITS: ICD-10-CM

## 2024-06-10 DIAGNOSIS — K50.119 CROHN'S DISEASE OF COLON WITH COMPLICATION (HCC): ICD-10-CM

## 2024-06-10 DIAGNOSIS — K59.00 CONSTIPATION, UNSPECIFIED CONSTIPATION TYPE: ICD-10-CM

## 2024-06-10 DIAGNOSIS — R93.5 ABNORMAL ABDOMINAL CT SCAN: ICD-10-CM

## 2024-06-10 DIAGNOSIS — R10.9 ABDOMINAL CRAMPS: ICD-10-CM

## 2024-06-10 ASSESSMENT — RHEUMATOLOGY NEW PATIENT QUESTIONNAIRE
DIZZINESS: Y
DEPRESSION: Y
ANXIETY: Y
TINGLING: Y
NASAL CONGESTION: Y
NUMBNESS: Y
BLURRY VISION: Y
CHARACTERISTIC: SAME WITH ACTIVITY
EYE PAIN: Y
IRREGULAR HEARTBEATS: Y
MARK ALL THE AREAS OF PAIN: 102457569
VERTIGO: Y
EYE REDNESS: Y
HAIR SHEDDING: Y
FAST HEARTBEATS: Y
JOINT PAIN: SAME WITH ACTIVITY
RINGING IN EARS: Y
RATE_1TO10: 8
FATIGUE: Y
DRY EYES: Y
DURATION: <30 MINS

## 2024-06-12 ENCOUNTER — OFFICE VISIT (OUTPATIENT)
Dept: RHEUMATOLOGY | Facility: MEDICAL CENTER | Age: 44
End: 2024-06-12
Attending: STUDENT IN AN ORGANIZED HEALTH CARE EDUCATION/TRAINING PROGRAM
Payer: COMMERCIAL

## 2024-06-12 VITALS
HEART RATE: 85 BPM | OXYGEN SATURATION: 96 % | SYSTOLIC BLOOD PRESSURE: 116 MMHG | BODY MASS INDEX: 27.74 KG/M2 | DIASTOLIC BLOOD PRESSURE: 76 MMHG | HEIGHT: 65 IN | TEMPERATURE: 98.2 F | WEIGHT: 166.5 LBS

## 2024-06-12 DIAGNOSIS — K50.119 CROHN'S DISEASE OF COLON, UNSPECIFIED COMPLICATION (HCC): ICD-10-CM

## 2024-06-12 DIAGNOSIS — J45.40 MODERATE PERSISTENT ALLERGIC ASTHMA: ICD-10-CM

## 2024-06-12 DIAGNOSIS — Z51.81 ENCOUNTER FOR MONITORING SULFASALAZINE THERAPY: ICD-10-CM

## 2024-06-12 DIAGNOSIS — M07.60 CROHN'S RELATED ARTHRITIS (HCC): ICD-10-CM

## 2024-06-12 DIAGNOSIS — K50.90 CROHN'S RELATED ARTHRITIS (HCC): ICD-10-CM

## 2024-06-12 DIAGNOSIS — Z79.899 ENCOUNTER FOR MONITORING SULFASALAZINE THERAPY: ICD-10-CM

## 2024-06-12 PROBLEM — K50.918 CROHN'S DISEASE, UNSPECIFIED, WITH OTHER COMPLICATION (HCC): Status: ACTIVE | Noted: 2024-06-12

## 2024-06-12 PROCEDURE — 99204 OFFICE O/P NEW MOD 45 MIN: CPT | Performed by: STUDENT IN AN ORGANIZED HEALTH CARE EDUCATION/TRAINING PROGRAM

## 2024-06-12 PROCEDURE — 3074F SYST BP LT 130 MM HG: CPT | Performed by: STUDENT IN AN ORGANIZED HEALTH CARE EDUCATION/TRAINING PROGRAM

## 2024-06-12 PROCEDURE — 3078F DIAST BP <80 MM HG: CPT | Performed by: STUDENT IN AN ORGANIZED HEALTH CARE EDUCATION/TRAINING PROGRAM

## 2024-06-12 PROCEDURE — 99212 OFFICE O/P EST SF 10 MIN: CPT | Performed by: STUDENT IN AN ORGANIZED HEALTH CARE EDUCATION/TRAINING PROGRAM

## 2024-06-12 RX ORDER — TIOTROPIUM BROMIDE INHALATION SPRAY 3.12 UG/1
SPRAY, METERED RESPIRATORY (INHALATION)
COMMUNITY

## 2024-06-12 RX ORDER — TRAZODONE HYDROCHLORIDE 50 MG/1
TABLET ORAL
COMMUNITY
Start: 2024-05-21

## 2024-06-12 RX ORDER — AZELASTINE HYDROCHLORIDE 137 UG/1
2 SPRAY, METERED NASAL 2 TIMES DAILY
COMMUNITY
Start: 2024-04-16

## 2024-06-12 RX ORDER — FLUTICASONE PROPIONATE 50 MCG
SPRAY, SUSPENSION (ML) NASAL
COMMUNITY

## 2024-06-12 ASSESSMENT — PATIENT HEALTH QUESTIONNAIRE - PHQ9
SUM OF ALL RESPONSES TO PHQ QUESTIONS 1-9: 7
5. POOR APPETITE OR OVEREATING: 0 - NOT AT ALL
CLINICAL INTERPRETATION OF PHQ2 SCORE: 1

## 2024-06-12 ASSESSMENT — FIBROSIS 4 INDEX: FIB4 SCORE: 0.51

## 2024-06-12 NOTE — PROGRESS NOTES
[facility] RHEUMATOLOGY  [address], [address], [place] [address]  Phone: [phone] ? Fax: [phone]  [facility]/Health-Services/Rheumatology    NEW CONSULT VISIT NOTE      DATE OF SERVICE: 06/12/2024         Subjective     REFERRING PRACTITIONER:  Abdon Sanchez II, M.D.  [address] [place] [address]    PATIENT IDENTIFICATION:  Gina Little  [address] [address]    YOB: 1980    MEDICAL RECORD NUMBER: [id number]         CHIEF COMPLAINT:   Chief Complaint   Patient presents with    New Patient     Disease of intestine, unspecified       HISTORY OF PRESENT ILLNESS:  Gina Little is a 44 y.o. female with pertinent history notable for Crohn's disease diagnosed in 3/2024 based on colonoscopy with biopsy at [facility] (reportedly symptomatic since 2021), moderate persistent allergic asthma, and multiple food/environmental allergies among other comorbidities. Presents for evaluation of unexplained polyarthralgia in the setting of her Crohn's disease that raise concern for inflammatory arthritis. Reports onset in 2021 with variable but progressive course of symptoms including joint pain that started in her hips, then neck/upper and lower back, and subsequently ankles, feet, wrists and hands, less than 30 minutes of morning stiffness, worsening pain on physical activity, dry eyes with pain/redness, and fatigue among multiple symptoms. She had been managing with Tylenol Arthritis with modest relief, but has experienced more significant improvement following initiation of sulfasalazine. Reports supplemental aspects of her symptoms and medical history as noted on the questionnaire below or scanned under media tab.    Pertinent treatments: Prednisone 20 mg taper for asthma flare (avoids due to severe palpitations), sulfasalazine 1000 mg BID (3/2024-present, effective).    Pertinent positive labs: Positive fecal calprotectin 80<1670 and fecal  lactoferrin (in 5/2024<1/2024); high IgE 1343 with multiple food/environmental allergies (in 8/2023); low WBC 4.5 and ANC 1.42, and high AEC 0.57 (in 5/2024).    Pertinent negative labs: Negative RF, anti-CCP, HLA-B27, KINGS, anti-centromere B, C3, C4, CH50, vitamin D (in 8/2023), ANCA, anti-MPO, anti-PR3, anti-GBM, HAV, HCV, and TSH (in 12/2023), HBV, QTB, TPMT, eGFR, creatinine, and LFTs (in 5/2024).    [facility] Rheumatology New Patient History Form    6/10/2024  3:02 PM PDT - Filed by Patient   Demographic Information   Marital Status: Domestic Partnership   Occupation: [employer] ex   Highest Level of Education:    MAIN REASON FOR VISIT Pain for over four years, in hips back, nexk hands finger feet, legs   HISTORY OF PRESENT ILLNESS   Date of symptom onset: 2021   Preceding incident/ailment:    Describe/list your symptoms: Severe pain started in hips, in 2021, neck, lower back, feet, ankels, hands wrist fingers, its constant. Painful.   Exacerbating factors: Standing too long, hurts at times to walk, working, when my body is resting it hurts more too.   Alleviating factors: Resting helps, not that much movement, when im in bed still, my new medication for chrons has started to help me.   Helpful medications: SULFASALAZINE, Tylenol arthritis   Ineffective medications: I wouldnt know   Severity of pain (scale of 1-10): 8   Personal/emotional stressors: I cant do the things i love to do, i was a very physical and active woman and im not anymore being in pain, and i cant work as much anymore it feels worse   Alex All The Areas Of Pain    REVIEW OF SYMPTOMS    General   Fevers    Chills    Night sweats    Malaise    Fatigue Yes   Unintentional weight loss    Musculoskeletal   Joint pain Same with activity   Morning stiffness duration <30 mins   Morning stiffness characteristic Same with activity   Joint swelling    Joint instability    Tendon pain    Muscle pain    Body aches    Dermatologic   Hair loss with bald spots    Hair  shedding Yes   Skin thickening    Skin plaques    Sunlight-induced skin rash    Cold-induced color changes (white, purple, red on rewarming)    Neurologic/Psychiatric   Weakness    Spasms    Tingling Yes   Burning    Numbness Yes   Insomnia    Anxiety Yes   Depression Yes   Head/Eyes   Headaches    Temple pain    Dizziness Yes   Dry eyes Yes   Eye pain Yes   Eye redness Yes   Blurry vision Yes   Vision loss    Ears/Nose   Ear pain    Ringing in ears Yes   Vertigo Yes   Hearing loss    Nasal ulcers    Nosebleeds    Sinus pain    Nasal congestion Yes   Snoring    Mouth/Throat   Oral ulcers    Bleeding gums    Dry mouth    Cavities    Sore throat    Sticking in throat    Difficulty speaking    Neck/Lymphatics   Thyroid pain    Thyroid swelling    Lymph node swelling    Cardiac/Respiratory   Chest pain with breathing    Dry cough    Cough with bloody phlegm    Shortness of breath    Fast heartbeats Yes   Irregular heartbeats Yes   Gastrointestinal   Nausea    Vomiting    Difficulty swallowing    Heartburn    Abdominal pain    Bloody stool    Mucus stool    Genitourinary   Pelvic pain    Genital ulcers    Abnormal discharge    Burning urination    Frothy urine    Blood in urine    MEDICAL/PERSONAL HISTORY DETAILS   Current Medical Problems: Diagnosed with crohns colitis march 12 24   Past/Resolved Medical Problems:    Surgeries/Procedures (include month/year): I have had 5 surgeries don’t remember exact dates, or year .   Prescription/Over-The-Counter/Herbal Medications:    Medication/Food/Material Allergies (include reactions): Penicillin, narcotics prescribed   Immunizations (include month/year)    Alcohol/Tobacco/Recreational Drugs: I dont smoke or drink   Family Medical History (father, mother, siblings only): My older just passed away, lots of health issues, never had my father so i dont know health history my mom has high blood pressure, high cholesterol, my middle brother is helathy       REVIEW OF  SYSTEMS:  Except as noted in the history above, relevant review of systems with emphasis on autoimmune rheumatic diseases was otherwise negative.      ACTIVE PROBLEM LIST:  Patient Active Problem List    Diagnosis Date Noted    Crohn's related arthritis (HCC) 06/12/2024    Moderate persistent allergic asthma 06/12/2024    Encounter for monitoring sulfasalazine therapy 06/12/2024    Crohn's disease of colon, unspecified complication (HCC) 06/12/2024    Palpitations 07/21/2022       PAST MEDICAL HISTORY:  Past Medical History:   Diagnosis Date    Anesthesia 07/29/2022    pt with strong history of motion sickness    Arrhythmia     Arthritis 07/29/2022    osteo bilateral hips    Asthma 07/29/2022    uses inhalers daily    Crohn's disease (HCC)     Endometriosis 09/16/2022    Fatty liver     Gynecological disorder 09/16/2022    Infectious disease 07/29/2022    7/15/22 pt tested positive for covid with sxs.  As of 7/29/22 pt still has sxs of congestion, cough, loss of voice.  Pt tested negative 7/28/22 at home.  Dr. Morales's office notified.    Palpitations     Pneumonia 07/29/2022    AGE 10    PONV (postoperative nausea and vomiting) 07/29/2022    pt with strong history of motion sickness    PONV (postoperative nausea and vomiting) 09/16/2022    Patient states, Severe PONV With Surgeries.       PAST SURGICAL HISTORY:  Past Surgical History:   Procedure Laterality Date    OTHER ORTHOPEDIC SURGERY Left     Surgery for dislocated left knee cap.    OTHER ORTHOPEDIC SURGERY Right     Right Shoulder Tendon Surgery.    TONSILLECTOMY      Age 35    TUBAL LIGATION      With IUD Removal, Age 31.       SOCIAL HISTORY:  Social History     Socioeconomic History    Marital status: Single     Spouse name: Not on file    Number of children: Not on file    Years of education: Not on file    Highest education level: Not on file   Occupational History    Not on file   Tobacco Use    Smoking status: Never     Passive exposure: Never     Smokeless tobacco: Never   Vaping Use    Vaping status: Never Used   Substance and Sexual Activity    Alcohol use: Never    Drug use: Never    Sexual activity: Not on file   Other Topics Concern    Not on file   Social History Narrative    Not on file     Social Determinants of Health     Financial Resource Strain: Not on file   Food Insecurity: Not on file   Transportation Needs: Not on file   Physical Activity: Not on file   Stress: Not on file   Social Connections: Not on file   Intimate Partner Violence: Not on file   Housing Stability: Not on file       FAMILY HISTORY:  History reviewed. No pertinent family history.    MEDICATIONS:  Current Outpatient Medications   Medication Sig    Azelastine (ASTELIN) 137 MCG/SPRAY Solution 2 Sprays 2 times a day.    SPIRIVA RESPIMAT 2.5 MCG/ACT Aero Soln INHALE 2 SPRAY(S) BY MOUTH ONCE DAILY    fluticasone (FLONASE) 50 MCG/ACT nasal spray USE 1 TO 2 SPRAY(S) IN EACH NOSTRIL ONCE DAILY    traZODone (DESYREL) 50 MG Tab TAKE 2 TABLETS BY MOUTH ONCE DAILY AT BEDTIME FOR SLEEP    folic acid (FOLVITE) 1 MG Tab Take 1 mg by mouth every day.    sulfaSALAzine (AZULFIDINE) 500 MG Tab     albuterol 108 (90 Base) MCG/ACT Aero Soln inhalation aerosol Inhale 1 Puff.    VENTOLIN  (90 Base) MCG/ACT Aero Soln inhalation aerosol INHALE 1 TO 2 PUFFS BY MOUTH EVERY 4 TO 6 HOURS AS NEEDED    diphenhydrAMINE HCl (BENADRYL ALLERGY PO) Take  by mouth.    Multiple Vitamins-Minerals (MULTIPLE VITAMINS/WOMENS PO) Take  by mouth every day.    fluticasone-salmeterol (ADVAIR) 500-50 MCG/ACT AEROSOL POWDER, BREATH ACTIVATED INHALE 1 DOSE BY MOUTH TWICE DAILY FOR 30 DAYS    amLODIPine (NORVASC) 5 MG Tab Take 5 mg by mouth every day.    metoprolol SR (TOPROL XL) 25 MG TABLET SR 24 HR Take 25 mg by mouth every day.       ALLERGIES:   Allergies   Allergen Reactions    Hydrocodone-Acetaminophen Vomiting and Nausea     VICODIN    Morphine Vomiting and Nausea    Penicillin G Hives and Shortness of Breath  "    Reaction At AGE 10  Pt. States Was Given For Pneumonia.     Other Misc Nausea       IMMUNIZATIONS:  Immunization History   Administered Date(s) Administered    Influenza Vaccine Quad Inj (Pf) 09/16/2022            Objective     Vital Signs: /76 (BP Location: Left arm, Patient Position: Sitting, BP Cuff Size: Adult)   Pulse 85   Temp 36.8 °C (98.2 °F) (Temporal)   Ht 1.651 m (5' 5\")   Wt 75.5 kg (166 lb 8 oz)   SpO2 96% Body mass index is 27.71 kg/m².    General: Appears well and comfortable  Eyes: No scleral or conjunctival lesions  ENT: No apparent oral, nasal, or ear lesions  Head/Neck: No apparent scalp or neck lesions  Cardiovascular: Regular rate and rhythm; no pericardial rubs  Respiratory: Breathing quiet and unlabored; no rales or pleural rubs  Gastrointestinal: No apparent organomegaly or abdominal masses  Integumentary: No significant cutaneous lesions or dyspigmentation  Musculoskeletal: Mild tenderness of hips (over bilateral trochanteric bursa), mid cervical and lower lumbar spine; VERONICA test elicits poorly localized mild tenderness of hips with equivocal SI joint tenderness; no overt signs of synovitis or significant restriction in range of motion of other joints examined  Neurologic: No focal sensory or motor deficits  Psychiatric: Mood and affect appropriate      LABORATORY RESULTS REVIEWED AND INTERPRETED BY ME:  Lab Results   Component Value Date/Time    CREACTPROT <0.30 05/21/2024 07:11 AM    SEDRATEWES 13 03/10/2023 10:04 AM    URICACID 6.3 06/09/2023 08:48 AM     Lab Results   Component Value Date/Time    K2FKFZDAKLX 175.6 08/29/2023 07:16 AM    O9ZPJOZUELD 28.2 08/29/2023 07:16 AM     Lab Results   Component Value Date/Time    RHEUMFACTN <10 08/29/2023 07:16 AM    CCPANTIBODY 3 06/09/2023 08:48 AM    LLMU00NPEG See Note 08/29/2023 07:16 AM     Lab Results   Component Value Date/Time    ANTINUCAB None Detected 08/29/2023 07:16 AM     Lab Results   Component Value Date/Time    " ANTIDNADS SEE BELOW 08/29/2023 07:16 AM    SMITHAB 2 08/29/2023 07:16 AM    RNPAB 2 08/29/2023 07:16 AM    CENTROMBAB 1 08/29/2023 07:16 AM    SSA60 2 08/29/2023 07:16 AM    SSA52 4 08/29/2023 07:16 AM    ANTISSBSJ 1 08/29/2023 07:16 AM     Lab Results   Component Value Date/Time    GBMABG Negative 12/07/2023 08:39 AM     Lab Results   Component Value Date/Time    TSHULTRASEN 1.370 12/07/2023 08:39 AM     Lab Results   Component Value Date/Time    25HYDROXY 41 08/29/2023 07:16 AM    PTHINTACT 47.4 04/25/2023 08:19 AM     Lab Results   Component Value Date/Time    FERRITIN 52.4 02/10/2023 03:00 PM    IRON 71 02/10/2023 03:01 PM    TRANSFERRIN 286 02/10/2023 03:00 PM     Lab Results   Component Value Date/Time    WBC 4.5 (L) 05/21/2024 07:11 AM    RBC 4.25 05/21/2024 07:11 AM    HEMOGLOBIN 14.1 05/21/2024 07:11 AM    HEMATOCRIT 41.9 05/21/2024 07:11 AM    MCV 98.6 (H) 05/21/2024 07:11 AM    MCH 33.2 (H) 05/21/2024 07:11 AM    MCHC 33.7 05/21/2024 07:11 AM    RDW 44.7 05/21/2024 07:11 AM    PLATELETCT 364 05/21/2024 07:11 AM    MPV 9.1 05/21/2024 07:11 AM    NEUTS 1.42 (L) 05/21/2024 07:11 AM    LYMPHOCYTES 46.50 (H) 05/21/2024 07:11 AM    MONOCYTES 7.80 05/21/2024 07:11 AM    EOSINOPHILS 12.80 (H) 05/21/2024 07:11 AM    BASOPHILS 1.10 05/21/2024 07:11 AM     Lab Results   Component Value Date/Time    ASTSGOT 24 05/21/2024 07:11 AM    ALTSGPT 34 05/21/2024 07:11 AM    ALKPHOSPHAT 65 05/21/2024 07:11 AM    TBILIRUBIN 0.5 05/21/2024 07:11 AM    TOTPROTEIN 7.6 05/21/2024 07:11 AM    ALBUMIN 4.8 05/21/2024 07:11 AM     Lab Results   Component Value Date/Time    SODIUM 138 05/21/2024 07:11 AM    POTASSIUM 3.6 05/21/2024 07:11 AM    CHLORIDE 103 05/21/2024 07:11 AM    CO2 23 05/21/2024 07:11 AM    GLUCOSE 94 05/21/2024 07:11 AM    BUN 12 05/21/2024 07:11 AM    CREATININE 0.75 05/21/2024 07:11 AM    BUNCREATRAT 15 08/26/2022 04:23 AM    CALCIUM 9.6 05/21/2024 07:11 AM    MAGNESIUM 1.9 08/26/2022 04:23 AM     Lab Results    Component Value Date/Time    TOTALVOLUME 850 04/27/2023 05:30 AM    CREATININEU 1156 04/27/2023 05:30 AM     Lab Results   Component Value Date/Time    COLORURINE Yellow 04/15/2024 02:02 PM    SPECGRAVITY 1.017 04/15/2024 02:02 PM    PHURINE 7.0 04/15/2024 02:02 PM    GLUCOSEUR Negative 04/15/2024 02:02 PM    KETONES Negative 04/15/2024 02:02 PM    PROTEINURIN Negative 04/15/2024 02:02 PM     Lab Results   Component Value Date/Time    HEPBSAG Non-Reactive 05/21/2024 07:11 AM    HEPBCORIGM Non-Reactive 12/07/2023 08:39 AM    HEPCAB Non-Reactive 12/07/2023 08:39 AM       RADIOLOGY RESULTS REVIEWED AND INTERPRETED BY ME:  Results for orders placed in visit on 01/24/23    DX-ANKLE 3+ VIEWS LEFT    Impression  No evidence of fracture or dislocation.    Results for orders placed during the hospital encounter of 05/19/23    DS-BONE DENSITY STUDY (DEXA)    Impression  According to the World Health Organization classification, bone mineral density of this patient is normal in the lumbar spine and left femur.    10-year Probability of Fracture:  Major Osteoporotic     1.2%  Hip     0.0%  Population      [place] ()    Based on left femur neck BMD      All relevant laboratory and imaging results reported on this note were reviewed and interpreted by me.         Assessment & Plan     Gina Little is a 44 y.o. female with history and physical as noted above whose presentation merits the following clinical impressions and recommendations:    1. Crohn's related arthritis (HCC)  Clinical picture in the context of her Crohn's disease is consistent with this diagnosis as an IBD-associated spondylarthritis, though her symptomatology with no significant morning stiffness is unusual. In any case, she appears to have experienced significant improvement following initiation of sulfasalazine, so would continue this regimen along with routine lab monitoring of her overall trajectory. Prior to her next visit, need to assess  the remainder of her immunologic profile and radiographic status for risk stratification.  - CARBAMYLATED PROTEIN AB, IGG; Future  - CRP QUANTITIVE (NON-CARDIAC); Future  - Sed Rate; Future  - CBC WITH DIFFERENTIAL; Future  - Comp Metabolic Panel; Future  - DX-HIP-BILATERAL-WITH PELVIS-2 VIEWS; Future  - DX-CERVICAL SPINE-2 OR 3 VIEWS; Future  - Continue sulfasalazine 1000 mg twice daily  - Consider escalation to biologic (e.g. Humira or Remicade) or targeted synthetic (e.g. Rinvoq or Xeljanz) therapy depending on her clinical trajectory with regard to her joints and gut    2. Crohn's disease of colon, unspecified complication (HCC)  Clinically and immunologically with significant improvement following initiation of sulfasalazine.  - CALPROTECTIN,FECAL; Future  - FECAL LACTOFERRIN QUALITATIVE; Future  - Continue sulfasalazine as noted above  - Routine follow-up with gastroenterology    3. Moderate persistent allergic asthma  Given her high IgE with numerous food/environmental allergies, would highly recommend initiation of biologic therapy with Xolair (monoclonal antibody against IgE blocker).  - Referral to Allergy (for Xolair prescription)  - Continue Spiriva, Flonase, and Astelin per PCP    4. Encounter for monitoring sulfasalazine therapy  Presently with no history, physical, or laboratory evidence to suggest significant adverse drug effects, but need routine monitoring per guidelines.  - CBC and CMP as ordered above      The above assessment and plan were discussed with the patient who acknowledged understanding of the plan.    FOLLOW-UP: Return in about 3 months (around 9/12/2024) for Short.         Thank you for the opportunity to participate in the care of Gina Little.    Saurabh Paris MD, MS, FACR  Rheumatologist, [facility] Rheumatology, [facility]   of Clinical Medicine, Department of Internal Medicine  [facility] ? [facility]  School of Medicine

## 2024-06-12 NOTE — PATIENT INSTRUCTIONS
AFTER VISIT INSTRUCTIONS    Below are important information to help you navigate your healthcare needs and help us serve you safely and effectively:  If laboratory tests and/or imaging studies were ordered, remember to go get them done as instructed.  If new prescriptions and/or refills were sent, remember to go pick them up from your local pharmacy, or call the specialty pharmacy to request shipment.  Always take your prescription medications exactly as prescribed unless instructed otherwise.  Note that antirheumatic drugs and steroids are immunosuppressive which means they increase your risk of infections and have multiple potential adverse effects on various organ systems in your body, though many of them are uncommon.  It is important that you are up-to-date on age-appropriate immunizations, particularly shingles and bacterial/viral pneumonia vaccines, which you can request from me or your primary care provider.  Be sure to read the drug package inserts to learn about the potential side effects of your medications before you start taking them.  If you experience any significant drug side effects, stop taking the medication and notify me promptly, and depending on the severity of the side effects, consider going to an urgent care or emergency department for immediate attention.  If there are significant findings on your lab tests and imaging studies that warrant further action, I will notify you with explanations via [employer] or phone call, otherwise you can view them on [employer] and let me know if you have any questions.  Note that [employer] messages are typically read during office hours and may take 1-7 business days before a response depending on the urgency of the situation and how busy my clinic schedule is.  In general, [employer] messaging is for non-urgent matters that do not require immediate attention, so for urgent matters that cannot wait, you are advised to go to an urgent care.  You are granted [facility]  access to my documentation of your visit and are encouraged to read my note which details my assessment and plan for your condition.  To learn more about your condition and rheumatic diseases evaluated and treated by rheumatologists, as well as gain access to many helpful resources about these diseases, visit our website: [email or web address].  To properly dispose of your unused, unwanted, or residual medications/supplies, visit the Drug Enforcement Administration website to locate your closest drop-off location: www.[place].gov/everyday-takeback-day.

## 2024-06-20 ENCOUNTER — HOSPITAL ENCOUNTER (OUTPATIENT)
Dept: LAB | Facility: MEDICAL CENTER | Age: 44
End: 2024-06-20
Payer: COMMERCIAL

## 2024-06-20 LAB — CRP SERPL HS-MCNC: <0.3 MG/DL (ref 0–0.75)

## 2024-06-20 PROCEDURE — 83993 ASSAY FOR CALPROTECTIN FECAL: CPT

## 2024-06-20 PROCEDURE — 86140 C-REACTIVE PROTEIN: CPT

## 2024-06-20 PROCEDURE — 36415 COLL VENOUS BLD VENIPUNCTURE: CPT

## 2024-06-22 LAB — CALPROTECTIN STL-MCNT: 19 UG/G

## 2024-06-24 ENCOUNTER — HOSPITAL ENCOUNTER (OUTPATIENT)
Dept: RADIOLOGY | Facility: MEDICAL CENTER | Age: 44
End: 2024-06-24
Attending: STUDENT IN AN ORGANIZED HEALTH CARE EDUCATION/TRAINING PROGRAM
Payer: COMMERCIAL

## 2024-06-24 DIAGNOSIS — M07.60 CROHN'S RELATED ARTHRITIS (HCC): ICD-10-CM

## 2024-06-24 DIAGNOSIS — K50.90 CROHN'S RELATED ARTHRITIS (HCC): ICD-10-CM

## 2024-06-24 PROCEDURE — 72040 X-RAY EXAM NECK SPINE 2-3 VW: CPT

## 2024-06-24 PROCEDURE — 73521 X-RAY EXAM HIPS BI 2 VIEWS: CPT

## 2024-08-10 ENCOUNTER — HOSPITAL ENCOUNTER (OUTPATIENT)
Dept: LAB | Facility: MEDICAL CENTER | Age: 44
End: 2024-08-10
Attending: NURSE PRACTITIONER
Payer: COMMERCIAL

## 2024-08-10 LAB
ALBUMIN SERPL BCP-MCNC: 4.6 G/DL (ref 3.2–4.9)
ALBUMIN/GLOB SERPL: 1.8 G/DL
ALP SERPL-CCNC: 66 U/L (ref 30–99)
ALT SERPL-CCNC: 29 U/L (ref 2–50)
ANION GAP SERPL CALC-SCNC: 12 MMOL/L (ref 7–16)
AST SERPL-CCNC: 21 U/L (ref 12–45)
BILIRUB SERPL-MCNC: 0.7 MG/DL (ref 0.1–1.5)
BUN SERPL-MCNC: 11 MG/DL (ref 8–22)
CALCIUM ALBUM COR SERPL-MCNC: 9.1 MG/DL (ref 8.5–10.5)
CALCIUM SERPL-MCNC: 9.6 MG/DL (ref 8.5–10.5)
CHLORIDE SERPL-SCNC: 102 MMOL/L (ref 96–112)
CHOLEST SERPL-MCNC: 180 MG/DL (ref 100–199)
CO2 SERPL-SCNC: 24 MMOL/L (ref 20–33)
CREAT SERPL-MCNC: 0.76 MG/DL (ref 0.5–1.4)
FASTING STATUS PATIENT QL REPORTED: NORMAL
GFR SERPLBLD CREATININE-BSD FMLA CKD-EPI: 98 ML/MIN/1.73 M 2
GLOBULIN SER CALC-MCNC: 2.5 G/DL (ref 1.9–3.5)
GLUCOSE SERPL-MCNC: 100 MG/DL (ref 65–99)
HDLC SERPL-MCNC: 46 MG/DL
LDLC SERPL CALC-MCNC: 114 MG/DL
POTASSIUM SERPL-SCNC: 4.3 MMOL/L (ref 3.6–5.5)
PROT SERPL-MCNC: 7.1 G/DL (ref 6–8.2)
SODIUM SERPL-SCNC: 138 MMOL/L (ref 135–145)
TRIGL SERPL-MCNC: 98 MG/DL (ref 0–149)

## 2024-08-10 PROCEDURE — 36415 COLL VENOUS BLD VENIPUNCTURE: CPT

## 2024-08-10 PROCEDURE — 80053 COMPREHEN METABOLIC PANEL: CPT

## 2024-08-10 PROCEDURE — 80061 LIPID PANEL: CPT

## 2024-09-04 ENCOUNTER — OCCUPATIONAL MEDICINE (OUTPATIENT)
Dept: URGENT CARE | Facility: PHYSICIAN GROUP | Age: 44
End: 2024-09-04
Payer: COMMERCIAL

## 2024-09-04 VITALS
BODY MASS INDEX: 28.09 KG/M2 | DIASTOLIC BLOOD PRESSURE: 84 MMHG | WEIGHT: 168.6 LBS | OXYGEN SATURATION: 97 % | TEMPERATURE: 97.2 F | RESPIRATION RATE: 18 BRPM | SYSTOLIC BLOOD PRESSURE: 132 MMHG | HEIGHT: 65 IN | HEART RATE: 85 BPM

## 2024-09-04 DIAGNOSIS — S69.92XA JAMMED INTERPHALANGEAL JOINT OF FINGER OF LEFT HAND, INITIAL ENCOUNTER: ICD-10-CM

## 2024-09-04 PROCEDURE — 3075F SYST BP GE 130 - 139MM HG: CPT | Performed by: FAMILY MEDICINE

## 2024-09-04 PROCEDURE — 99213 OFFICE O/P EST LOW 20 MIN: CPT | Performed by: FAMILY MEDICINE

## 2024-09-04 PROCEDURE — 3079F DIAST BP 80-89 MM HG: CPT | Performed by: FAMILY MEDICINE

## 2024-09-04 ASSESSMENT — FIBROSIS 4 INDEX: FIB4 SCORE: 0.48

## 2024-09-04 NOTE — LETTER
PHYSICIAN’S AND CHIROPRACTIC PHYSICIAN'S   PROGRESS REPORT CERTIFICATION OF DISABILITY Claim Number:     Social Security Number:    Patient’s Name: Gina Little Date of Injury: 9/3/2024   Employer:  [employer] Name of [employer] (if applicable):      Patient’s Job Description/Occupation:       Previous Injuries/Diseases/Surgeries Contributing to the Condition:  No      Diagnosis: (S69.92XA) Jammed interphalangeal joint of finger of left hand, initial encounter      Related to the Industrial Injury? Yes     Explain:        Objective Medical Findings: Edema noted to her left index PIP joint.  She still able to actively flex and extend the finger although it does elicit pain.         None - Discharged                         Stable  No                 Ratable  No        Generally Improved                         Condition Worsened               X   Condition Same  May Have Suffered a Permanent Disability No     Treatment Plan:    -Work restrictions include must wear splint while at work, maximum lifting 5 pounds, no pushing or pulling with her left hand  -Tylenol and Voltaren as needed for pain         No Change in Therapy                  PT/OT Prescribed                      Medication May be Used While Working        Case Management                          PT/OT Discontinued    Consultation    Further Diagnostic Studies:    Prescription(s)                 Released to FULL DUTY /No Restrictions on (Date):  From:      Certified TOTALLY TEMPORARILY DISABLED (Indicate Dates) From:   To:    X  Released to RESTRICTED/Modified Duty on (Date): From:   To:    Restrictions Are:  Temporary      No Sitting    No Standing    No Pulling Other: Must wear splint while at work, maximum lifting 5 lbs, no pushing/pulling with left hand       No Bending at Waist     No Stooping     No Lifting        No Carrying     No Walking Lifting Restricted to (lbs.):          No Pushing        No Climbing     No Reaching Above Shoulders        Date of Next Visit:  9/11/2024 Date of this Exam: 9/4/2024 Physician/Chiropractic Physician Name: Deisi Gomez M.D. Physician/Chiropractic Physician Signature:  Ivan Tomas DO MPH                                                                                                                                                                                                            D-[id number] (Rev. 2/24)

## 2024-09-04 NOTE — PROGRESS NOTES
"  Subjective:     Gina Little is a 44 y.o. female who presents for Finger Pain (Yesterday working at [employer] patient was moving boxes and jammed her Left pointer finger back and has pain and swelling since. Notable swelling to finger)    DOI: 9/3/2024  JEOVANY: She was pulling a box off the conveyer belt at work when it dropped and hyper extended her left index. The pain is intermittent and comes with movement, it's described as \"painful\", currently rated 5/10. She has tried tylenol with good relief in symptoms. She is right hand dominant. Denies prior left hand injury. No secondary employment.        Objective:     /84   Pulse 85   Temp 36.2 °C (97.2 °F) (Temporal)   Resp 18   Ht 1.651 m (5' 5\")   Wt 76.5 kg (168 lb 9.6 oz)   LMP 08/03/2022 (Within Days)   SpO2 97%   BMI 28.06 kg/m²     Edema noted to her left index PIP joint.  She still able to actively flex and extend the finger although it does elicit pain.    Assessment/Plan:     1. Jammed interphalangeal joint of finger of left hand, initial encounter      -Work restrictions include must wear splint while at work, maximum lifting 5 pounds, no pushing or pulling with her left hand  -Tylenol and Voltaren as needed for pain    "

## 2024-09-04 NOTE — LETTER
"    EMPLOYEE’S CLAIM FOR COMPENSATION/ REPORT OF INITIAL TREATMENT  FORM C-4  PLEASE TYPE OR PRINT    EMPLOYEE’S CLAIM - PROVIDE ALL INFORMATION REQUESTED   First Name                    BAKARI Fuentes                  Last Name  Sidney Birthdate                    1980                Sex  Female Claim Number (Insurer’s Use Only)     Home Address  [address] [place] [address] Age  44 y.o. Height  1.651 m (5' 5\") Weight  76.5 kg (168 lb 9.6 oz) Social Security Number     [facility] Zip  [address] Telephone  [phone] (home)    Mailing Address  [address] [facility]  [address] Primary Language Spoken  English    INSURER   TH[employer]   [employer]   Employee's Occupation (Job Title) When Injury or Occupational Disease Occurred      Employer's Name/Company Name    [employer] Telephone  [phone]    Office Mail Address (Number and Street)  [address] [facility].     Date of Injury (if applicable) 9/3/2024               Hours Injury (if applicable)  6:00 AM Date Employer Notified  9/4/2024 Last Day of Work after Injury or Occupational Disease  9/3/2024 Supervisor to Whom Injury Reported  Charis   Address or Location of Accident (if applicable)  Work [1]   What were you doing at the time of accident? (if applicable)  I was pulling off IC box down    How did this injury or occupational disease occur? (Be specific and answer in detail. Use additional sheet if necessary)  I was pulling off a box IC off [employer] as I was pulling it down I thought I had a  it came towards me and geovanni my finger bend all the way back I let the box fall and I was in a lot of pain after that   If you believe that you have an occupational disease, when did you first have knowledge of the disability and its relationship to your employment?  n/a Witnesses to the Accident (if " applicable)  n/a      Nature of Injury or Occupational Disease  Workers' Compensation  Part(s) of Body Injured or Affected  Finger (L) N/A N/A    I CERTIFY THAT THE ABOVE IS TRUE AND CORRECT TO T HE BEST OF MY KNOWLEDGE AND THAT I HAVE PROVIDED THIS INFORMATION IN ORDER TO OBTAIN THE BENEFITS OF [place], OR [place]  I HEREBY AUTHORIZE ANY PHYSICIAN, CHIROPRACTOR, SURGEON, PRACTITIONER OR ANY OTHER PERSON, ANY HOSPITAL, INCLUDING [facility] OR [facility], ANY  MEDICAL SERVICE ORGANIZATION, ANY INSURANCE COMPANY, OR OTHER INSTITUTION OR ORGANIZATION TO RELEASE TO EACH OTHER, ANY MEDICAL OR OTHER INFORMATION, INCLUDING BENEFITS PAID OR PAYABLE, PERTINENT TO THIS INJURY OR DISEASE, EXCEPT INFORMATION RELATIVE TO DIAGNOSIS, TREATMENT AND/OR COUNSELING FOR AIDS, PSYCHOLOGICAL CONDITIONS, ALCOHOL OR CONTROLLED SUBSTANCES, FOR WHICH I MUST GIVE SPECIFIC AUTHORIZATION.  A PHOTOSTAT OF THIS AUTHORIZATION SHALL BE VALID AS THE ORIGINAL.     Date   Place Employee’s Original or  *Electronic Signature   THIS REPORT MUST BE COMPLETED AND MAILED WITHIN 3 WORKING DAYS OF TREATMENT   Place  [facility]    Name of [facility]   Date 9/4/2024 Diagnosis and Description of Injury or Occupational Disease  (S69.92XA) Jammed interphalangeal joint of finger of left hand, initial encounter  The encounter diagnosis was Jammed interphalangeal joint of finger of left hand, initial encounter. Is there evidence that the injured employee was under the influence of alcohol and/or another controlled substance at the time of accident?  []No  [] Yes (if yes, please explain)   Hour 10:09 AM  No   Treatment: -Work restrictions include must wear splint while at work, maximum lifting 5 pounds, no pushing or pulling with her left hand  -Tylenol and Voltaren as needed for pain    Have you advised the patient to remain off  work five days or more?   [] Yes Indicate dates: From   To    [] No      If no, is the injured employee capable of: [] full duty [] modified duty                     If modified duty, specify any limitations / restrictions:  must wear splint while at work, maximum lifting 5 pounds, no pushing or pulling with her left hand                                                                                                                                                                                                                                                                                                                                                                                                                 X-Ray Findings:      From information given by the employee, together with medical evidence, can you directly connect this injury or occupational disease as job incurred?  []Yes   [] No Yes    Is additional medical care by a physician indicated? []Yes [] No  Yes    Do you know of any previous injury or disease contributing to this condition or occupational disease? []Yes [] No (Explain if yes)                          No   Date  9/4/2024 Print Health Care Provider’s Name  Jenifer Gomez M.D. I certify that the employer’s copy of  this form was delivered to the employer on:   Address  [address] [place] [address] INSURER'S USE ONLY                       [facility]  [id number] Provider’s Tax ID Number  [id number]   Telephone  Dept: [phone]    Health Care Provider’s Original or Electronic Signature  e-JENIFER Madden M.D. Degree (MD,DO, DC,PA-C,APRN)  MD  Choose (if applicable)      ORIGINAL - TREATING HEALTHCARE PROVIDER PAGE 2 - INSURER/TPA PAGE 3 - EMPLOYER PAGE 4 - EMPLOYEE             Form C-4 (rev.08/23)

## 2024-09-11 ENCOUNTER — OCCUPATIONAL MEDICINE (OUTPATIENT)
Dept: URGENT CARE | Facility: PHYSICIAN GROUP | Age: 44
End: 2024-09-11
Payer: COMMERCIAL

## 2024-09-11 ENCOUNTER — APPOINTMENT (OUTPATIENT)
Dept: RADIOLOGY | Facility: IMAGING CENTER | Age: 44
End: 2024-09-11
Attending: FAMILY MEDICINE
Payer: COMMERCIAL

## 2024-09-11 VITALS
DIASTOLIC BLOOD PRESSURE: 92 MMHG | TEMPERATURE: 96.7 F | OXYGEN SATURATION: 98 % | BODY MASS INDEX: 27.96 KG/M2 | SYSTOLIC BLOOD PRESSURE: 136 MMHG | WEIGHT: 168 LBS | RESPIRATION RATE: 14 BRPM | HEART RATE: 97 BPM

## 2024-09-11 DIAGNOSIS — S63.611D SPRAIN OF LEFT INDEX FINGER, UNSPECIFIED SITE OF DIGIT, SUBSEQUENT ENCOUNTER: ICD-10-CM

## 2024-09-11 PROCEDURE — 3080F DIAST BP >= 90 MM HG: CPT | Performed by: FAMILY MEDICINE

## 2024-09-11 PROCEDURE — 1126F AMNT PAIN NOTED NONE PRSNT: CPT | Performed by: FAMILY MEDICINE

## 2024-09-11 PROCEDURE — 99213 OFFICE O/P EST LOW 20 MIN: CPT | Performed by: FAMILY MEDICINE

## 2024-09-11 PROCEDURE — 73140 X-RAY EXAM OF FINGER(S): CPT | Mod: TC,FY,LT | Performed by: RADIOLOGY

## 2024-09-11 PROCEDURE — 3075F SYST BP GE 130 - 139MM HG: CPT | Performed by: FAMILY MEDICINE

## 2024-09-11 RX ORDER — OXYBUTYNIN CHLORIDE 5 MG/1
TABLET ORAL
COMMUNITY
Start: 2024-09-09

## 2024-09-11 RX ORDER — FLUTICASONE PROPIONATE AND SALMETEROL 250; 50 UG/1; UG/1
POWDER RESPIRATORY (INHALATION)
COMMUNITY
Start: 2024-09-02

## 2024-09-11 ASSESSMENT — FIBROSIS 4 INDEX: FIB4 SCORE: 0.48

## 2024-09-11 ASSESSMENT — PAIN SCALES - GENERAL: PAINLEVEL: NO PAIN

## 2024-09-11 NOTE — LETTER
PHYSICIAN’S AND CHIROPRACTIC PHYSICIAN'S   PROGRESS REPORT CERTIFICATION OF DISABILITY Claim Number:     Social Security Number:    Patient’s Name: Gina Little Date of Injury: 9/3/2024   Employer:  [employer] Name of [employer] (if applicable):      Patient’s Job Description/Occupation:        Previous Injuries/Diseases/Surgeries Contributing to the Condition:         Diagnosis: (I21.355C) Sprain of left index finger, unspecified site of digit, subsequent encounter      Related to the Industrial Injury? Yes     Explain: Left index finger from from box falling on it      Objective Medical Findings:           None - Discharged                         Stable  No                 Ratable  No     X   Generally Improved                         Condition Worsened                  Condition Same  May Have Suffered a Permanent Disability No     Treatment Plan:              No Change in Therapy                  PT/OT Prescribed                      Medication May be Used While Working        Case Management                          PT/OT Discontinued    Consultation    Further Diagnostic Studies:    Prescription(s)                 Released to FULL DUTY /No Restrictions on (Date):  From:      Certified TOTALLY TEMPORARILY DISABLED (Indicate Dates) From:   To:    X  Released to RESTRICTED/Modified Duty on (Date): From: 9/11/2024 To: 9/18/2024  Restrictions Are:  Temporary      No Sitting    No Standing    No Pulling Other: No use of left index finger       No Bending at Waist     No Stooping     No Lifting        No Carrying     No Walking Lifting Restricted to (lbs.):          No Pushing        No Climbing     No Reaching Above Shoulders       Date of Next Visit:  9/18/2024 Date of this Exam: 9/11/2024 Physician/Chiropractic Physician Name: Papi Miller M.D. Physician/Chiropractic Physician Signature:  Ivan Tomas DO MPH                                                                                                                                                                                                             D-39 (Rev. 2/24)

## 2024-09-11 NOTE — PROGRESS NOTES
"Subjective     Gina Little is a 44 y.o. female who presents with Follow-Up (L index)  \" DOI: 9/3/2024 JEOVANY: She was pulling a box off the conveyer belt at work when it dropped and hyper extended her left index. The pain is intermittent and comes with movement, it's described as \"painful\", currently rated 5/10. She has tried tylenol with good relief in symptoms. She is right hand dominant. Denies prior left hand injury. No secondary employment. \"    ** Today 9/11/2024 here for recheck of left index finger sprain from a hyperextension type injury.  Feels about the same may be slightly better seems to have still localized pain mainly around the PIP joint and hurts to bend it or grasp things.          HPI    Review of Systems   Musculoskeletal:  Positive for joint pain.   All other systems reviewed and are negative.             Objective     BP (!) 136/92   Pulse 97   Temp 35.9 °C (96.7 °F) (Temporal)   Resp 14   Wt 76.2 kg (168 lb)   LMP 08/03/2022 (Within Days)   SpO2 98%   BMI 27.96 kg/m²      Physical Exam  Vitals and nursing note reviewed.   Constitutional:       General: She is not in acute distress.     Appearance: Normal appearance. She is well-developed.   HENT:      Head: Normocephalic.   Pulmonary:      Effort: Pulmonary effort is normal. No respiratory distress.   Musculoskeletal:        Hands:       Comments: Left index finger with some edema tenderness to palpation over the PIP joint.  Good strength almost full range of motion but limited on the end of terminal end of flexion due to some pain and swelling.  Neurovascularly intact   Neurological:      Mental Status: She is alert.      Motor: No abnormal muscle tone.   Psychiatric:         Mood and Affect: Mood normal.         Behavior: Behavior normal.                             Assessment & Plan     1. Sprain of left index finger, unspecified site of digit, subsequent encounter  DX-FINGER(S) 2+ LEFT        1 week recheck sooner if " needed    No use of left index finger    Rest ice elevate when possible    Extra strength Tylenol as needed

## 2024-09-19 ENCOUNTER — HOSPITAL ENCOUNTER (OUTPATIENT)
Dept: LAB | Facility: MEDICAL CENTER | Age: 44
End: 2024-09-19
Attending: STUDENT IN AN ORGANIZED HEALTH CARE EDUCATION/TRAINING PROGRAM
Payer: COMMERCIAL

## 2024-09-19 ENCOUNTER — APPOINTMENT (OUTPATIENT)
Dept: RADIOLOGY | Facility: IMAGING CENTER | Age: 44
End: 2024-09-19
Attending: NURSE PRACTITIONER
Payer: COMMERCIAL

## 2024-09-19 ENCOUNTER — OCCUPATIONAL MEDICINE (OUTPATIENT)
Dept: URGENT CARE | Facility: PHYSICIAN GROUP | Age: 44
End: 2024-09-19
Payer: COMMERCIAL

## 2024-09-19 VITALS
SYSTOLIC BLOOD PRESSURE: 118 MMHG | RESPIRATION RATE: 16 BRPM | TEMPERATURE: 97.4 F | OXYGEN SATURATION: 96 % | HEIGHT: 65 IN | BODY MASS INDEX: 27.82 KG/M2 | WEIGHT: 167 LBS | DIASTOLIC BLOOD PRESSURE: 70 MMHG | HEART RATE: 62 BPM

## 2024-09-19 DIAGNOSIS — S63.611D SPRAIN OF LEFT INDEX FINGER, UNSPECIFIED SITE OF DIGIT, SUBSEQUENT ENCOUNTER: ICD-10-CM

## 2024-09-19 DIAGNOSIS — K50.90 CROHN'S RELATED ARTHRITIS (HCC): ICD-10-CM

## 2024-09-19 DIAGNOSIS — M07.60 CROHN'S RELATED ARTHRITIS (HCC): ICD-10-CM

## 2024-09-19 DIAGNOSIS — K50.119 CROHN'S DISEASE OF COLON, UNSPECIFIED COMPLICATION (HCC): ICD-10-CM

## 2024-09-19 LAB
BASOPHILS # BLD AUTO: 1.2 % (ref 0–1.8)
BASOPHILS # BLD: 0.07 K/UL (ref 0–0.12)
EOSINOPHIL # BLD AUTO: 0.74 K/UL (ref 0–0.51)
EOSINOPHIL NFR BLD: 12.5 % (ref 0–6.9)
ERYTHROCYTE [DISTWIDTH] IN BLOOD BY AUTOMATED COUNT: 42.5 FL (ref 35.9–50)
ERYTHROCYTE [SEDIMENTATION RATE] IN BLOOD BY WESTERGREN METHOD: 9 MM/HOUR (ref 0–25)
HCT VFR BLD AUTO: 38.2 % (ref 37–47)
HGB BLD-MCNC: 13.4 G/DL (ref 12–16)
IMM GRANULOCYTES # BLD AUTO: 0.01 K/UL (ref 0–0.11)
IMM GRANULOCYTES NFR BLD AUTO: 0.2 % (ref 0–0.9)
LACTOFERRIN STL QL IA: NEGATIVE
LYMPHOCYTES # BLD AUTO: 2.44 K/UL (ref 1–4.8)
LYMPHOCYTES NFR BLD: 41.1 % (ref 22–41)
MCH RBC QN AUTO: 34.3 PG (ref 27–33)
MCHC RBC AUTO-ENTMCNC: 35.1 G/DL (ref 32.2–35.5)
MCV RBC AUTO: 97.7 FL (ref 81.4–97.8)
MONOCYTES # BLD AUTO: 0.38 K/UL (ref 0–0.85)
MONOCYTES NFR BLD AUTO: 6.4 % (ref 0–13.4)
NEUTROPHILS # BLD AUTO: 2.3 K/UL (ref 1.82–7.42)
NEUTROPHILS NFR BLD: 38.6 % (ref 44–72)
NRBC # BLD AUTO: 0 K/UL
NRBC BLD-RTO: 0 /100 WBC (ref 0–0.2)
PLATELET # BLD AUTO: 344 K/UL (ref 164–446)
PMV BLD AUTO: 9.2 FL (ref 9–12.9)
RBC # BLD AUTO: 3.91 M/UL (ref 4.2–5.4)
WBC # BLD AUTO: 5.9 K/UL (ref 4.8–10.8)

## 2024-09-19 PROCEDURE — 3074F SYST BP LT 130 MM HG: CPT | Performed by: NURSE PRACTITIONER

## 2024-09-19 PROCEDURE — 36415 COLL VENOUS BLD VENIPUNCTURE: CPT

## 2024-09-19 PROCEDURE — 83630 LACTOFERRIN FECAL (QUAL): CPT

## 2024-09-19 PROCEDURE — 3078F DIAST BP <80 MM HG: CPT | Performed by: NURSE PRACTITIONER

## 2024-09-19 PROCEDURE — 83993 ASSAY FOR CALPROTECTIN FECAL: CPT

## 2024-09-19 PROCEDURE — 73140 X-RAY EXAM OF FINGER(S): CPT | Mod: TC,FY,LT | Performed by: RADIOLOGY

## 2024-09-19 PROCEDURE — 85025 COMPLETE CBC W/AUTO DIFF WBC: CPT

## 2024-09-19 PROCEDURE — 99213 OFFICE O/P EST LOW 20 MIN: CPT | Performed by: NURSE PRACTITIONER

## 2024-09-19 PROCEDURE — 80053 COMPREHEN METABOLIC PANEL: CPT

## 2024-09-19 PROCEDURE — 85652 RBC SED RATE AUTOMATED: CPT

## 2024-09-19 PROCEDURE — 83516 IMMUNOASSAY NONANTIBODY: CPT

## 2024-09-19 PROCEDURE — 86140 C-REACTIVE PROTEIN: CPT

## 2024-09-19 ASSESSMENT — FIBROSIS 4 INDEX: FIB4 SCORE: 0.48

## 2024-09-19 NOTE — LETTER
PHYSICIAN’S AND CHIROPRACTIC PHYSICIAN'S   PROGRESS REPORT CERTIFICATION OF DISABILITY Claim Number:     Social Security Number:    Patient’s Name: iGna Little Date of Injury: 9/3/2024   Employer:   Name of MCO (if applicable):      Patient’s Job Description/Occupation:        Previous Injuries/Diseases/Surgeries Contributing to the Condition:  None      Diagnosis: (I95.811L) Sprain of left index finger, unspecified site of digit, subsequent encounter      Related to the Industrial Injury? Yes     Explain: Hyperextension of left second digit at work.  Please see previous D39 paperwork.      Objective Medical Findings:    Comments: Swelling present to PIP to MCP of left 2nd digit            None - Discharged                         Stable  Yes                 Ratable  Yes     X   Generally Improved                         Condition Worsened                  Condition Same  May Have Suffered a Permanent Disability No     Treatment Plan:    Continue with work restrictions, gentle range of motion exercises, anti-inflammatories and pain relievers as needed.         No Change in Therapy                  PT/OT Prescribed                      Medication May be Used While Working        Case Management                          PT/OT Discontinued    Consultation    Further Diagnostic Studies:    Prescription(s) X-ray re performed today and negative findings for fracture or dislocation.               Released to FULL DUTY /No Restrictions on (Date):  From:      Certified TOTALLY TEMPORARILY DISABLED (Indicate Dates) From:   To:    X  Released to RESTRICTED/Modified Duty on (Date): From: 9/19/2024 To: 10/3/2024  Restrictions Are:  Temporary      No Sitting    No Standing    No Pulling Other: No lifting greater than 5 pounds of left hand       No Bending at Waist     No Stooping     No Lifting        No Carrying     No Walking Lifting Restricted to (lbs.):          No Pushing        No Climbing      No Reaching Above Shoulders       Date of Next Visit:  10/3/2024 Date of this Exam: 9/19/2024 Physician/Chiropractic Physician Name: CHANG Mills. Physician/Chiropractic Physician Signature:  Ivan Tomas DO MPH                                                                                                                                                                                                            D-39 (Rev. 2/24)

## 2024-09-19 NOTE — PROGRESS NOTES
"Subjective:     Gina Little is a 44 y.o. female who presents for Follow-Up (W/C, (L) index)      HPI  Gina Little is a 44 y.o. female who presents for Finger Pain (Yesterday working at [employer] patient was moving boxes and jammed her Left pointer finger back and has pain and swelling since. Notable swelling to finger)     DOI: 9/3/2024  JEOVANY: She was pulling a box off the conveyer belt at work when it dropped and hyper extended her left index. The pain is intermittent and comes with movement, it's described as \"painful\", currently rated 5/10. She has tried tylenol with good relief in symptoms. She is right hand dominant. Denies prior left hand injury. No secondary employment.    Visit #2.  Today 9/11/2024 here for recheck of left index finger sprain from a hyperextension type injury.  Feels about the same may be slightly better seems to have still localized pain mainly around the PIP joint and hurts to bend it or grasp things.    Visit #3. Pt presents to  on 9/19/2024 for an evaluation of her work comp injury that she sustained on 9/3/2024.  She continues to endorse pain and swelling from her PIP joint to her CMC joint of her left second digit.  There is pain with range of motion and flexion.  Her symptoms are progressively improving despite the swelling.  She is no longer wearing finger splint.  Work restrictions continue at lifting 5 pounds or less of left hand.       ROS    PMH:   Past Medical History:   Diagnosis Date    Anesthesia 07/29/2022    pt with strong history of motion sickness    Arrhythmia     Arthritis 07/29/2022    osteo bilateral hips    Asthma 07/29/2022    uses inhalers daily    Crohn's disease (HCC)     Endometriosis 09/16/2022    Fatty liver     Gynecological disorder 09/16/2022    Infectious disease 07/29/2022    7/15/22 pt tested positive for covid with sxs.  As of 7/29/22 pt still has sxs of congestion, cough, loss of voice.  Pt tested negative 7/28/22 at home.  Dr." "Carmen's office notified.    Palpitations     Pneumonia 07/29/2022    AGE 10    PONV (postoperative nausea and vomiting) 07/29/2022    pt with strong history of motion sickness    PONV (postoperative nausea and vomiting) 09/16/2022    Patient states, Severe PONV With Surgeries.     ALLERGIES:   Allergies   Allergen Reactions    Hydrocodone-Acetaminophen Vomiting and Nausea     VICODIN    Morphine Vomiting and Nausea    Penicillin G Hives and Shortness of Breath     Reaction At AGE 10  Pt. States Was Given For Pneumonia.     Other Misc Nausea     SURGHX:   Past Surgical History:   Procedure Laterality Date    OTHER ORTHOPEDIC SURGERY Left     Surgery for dislocated left knee cap.    OTHER ORTHOPEDIC SURGERY Right     Right Shoulder Tendon Surgery.    TONSILLECTOMY      Age 35    TUBAL LIGATION      With IUD Removal, Age 31.     SOCHX:   Social History     Socioeconomic History    Marital status: Single   Tobacco Use    Smoking status: Never     Passive exposure: Never    Smokeless tobacco: Never   Vaping Use    Vaping status: Never Used   Substance and Sexual Activity    Alcohol use: Never    Drug use: Never     FH: No family history on file.      Objective:   /70   Pulse 62   Temp 36.3 °C (97.4 °F) (Temporal)   Resp 16   Ht 1.651 m (5' 5\")   Wt 75.8 kg (167 lb)   LMP 08/03/2022 (Within Days)   SpO2 96%   BMI 27.79 kg/m²     Physical Exam  Vitals and nursing note reviewed.   Constitutional:       General: She is not in acute distress.     Appearance: Normal appearance. She is normal weight. She is not ill-appearing or toxic-appearing.   HENT:      Head: Normocephalic.      Right Ear: External ear normal.      Left Ear: External ear normal.      Nose: No congestion or rhinorrhea.      Mouth/Throat:      Pharynx: No oropharyngeal exudate or posterior oropharyngeal erythema.   Eyes:      General:         Right eye: No discharge.         Left eye: No discharge.      Pupils: Pupils are equal, round, and " reactive to light.   Pulmonary:      Effort: Pulmonary effort is normal.   Abdominal:      General: Abdomen is flat.   Musculoskeletal:         General: Normal range of motion.      Cervical back: Normal range of motion and neck supple.      Comments: Swelling present to PIP to MCP of left 2nd digit   Skin:     General: Skin is dry.   Neurological:      General: No focal deficit present.      Mental Status: She is alert and oriented to person, place, and time. Mental status is at baseline.   Psychiatric:         Mood and Affect: Mood normal.         Behavior: Behavior normal.         Thought Content: Thought content normal.         Judgment: Judgment normal.       DX-FINGER(S) 2+ LEFT    Result Date: 9/19/2024 9/19/2024 10:36 AM HISTORY/REASON FOR EXAM:  Pain/Deformity Following Trauma; 3 weeks post trauma. Continued swelling from left 2nd digit pip joint to mcp. . TECHNIQUE/EXAM DESCRIPTION AND NUMBER OF VIEWS:  3 views of the LEFT fingers. COMPARISON: Left hand x-ray 9/11/2024 FINDINGS: There is no fracture. Alignment is normal. No degenerative changes are present.     Negative frontal view of the hand and index finger series  Assessment/Plan:   Assessment      1. Sprain of left index finger, unspecified site of digit, subsequent encounter  DX-FINGER(S) 2+ LEFT        X-ray reperformed today due to patient's continued pain and swelling.  X-ray findings were negative for fracture.  She is likely suffering from sprain versus strain.  She will continue work restrictions and follow-up in urgent care in 2 weeks or sooner for worsening symptoms.  I did recommend gentle range of motion exercises, ice, anti-inflammatories and use of finger splint as needed.  She is in agreement with this plan of care.

## 2024-09-20 LAB
ALBUMIN SERPL BCP-MCNC: 4.7 G/DL (ref 3.2–4.9)
ALBUMIN/GLOB SERPL: 1.9 G/DL
ALP SERPL-CCNC: 66 U/L (ref 30–99)
ALT SERPL-CCNC: 40 U/L (ref 2–50)
ANION GAP SERPL CALC-SCNC: 17 MMOL/L (ref 7–16)
AST SERPL-CCNC: 31 U/L (ref 12–45)
BILIRUB SERPL-MCNC: 0.3 MG/DL (ref 0.1–1.5)
BUN SERPL-MCNC: 17 MG/DL (ref 8–22)
CALCIUM ALBUM COR SERPL-MCNC: 8.8 MG/DL (ref 8.5–10.5)
CALCIUM SERPL-MCNC: 9.4 MG/DL (ref 8.5–10.5)
CHLORIDE SERPL-SCNC: 101 MMOL/L (ref 96–112)
CO2 SERPL-SCNC: 20 MMOL/L (ref 20–33)
CREAT SERPL-MCNC: 0.71 MG/DL (ref 0.5–1.4)
CRP SERPL HS-MCNC: <0.3 MG/DL (ref 0–0.75)
GFR SERPLBLD CREATININE-BSD FMLA CKD-EPI: 106 ML/MIN/1.73 M 2
GLOBULIN SER CALC-MCNC: 2.5 G/DL (ref 1.9–3.5)
GLUCOSE SERPL-MCNC: 114 MG/DL (ref 65–99)
POTASSIUM SERPL-SCNC: 3.7 MMOL/L (ref 3.6–5.5)
PROT SERPL-MCNC: 7.2 G/DL (ref 6–8.2)
SODIUM SERPL-SCNC: 138 MMOL/L (ref 135–145)

## 2024-09-21 LAB — CALPROTECTIN STL-MCNT: 51 UG/G

## 2024-09-23 LAB — CARBAMYLATED PROTEIN ANTIBODY, IGG Q6043: 18 UNITS (ref 0–19)

## 2024-09-25 ASSESSMENT — RHEUMATOLOGY FOLLOW-UP QUESTIONNAIRE
MUCUS STOOL: Y
HAIR LOSS WITH BALD SPOTS: Y
MALAISE: Y
BLOODY STOOL: Y
HEARTBURN: Y
TENDON PAIN: Y
JOINT PAIN: SAME WITH ACTIVITY
RATE_1TO10: 5
BLURRY VISION: Y
COUGH WITH BLOODY PHLEGM: Y
VISION LOSS: Y
RINGING IN EARS: Y
SHORTNESS OF BREATH: Y
ANXIETY: Y
EYE PAIN: Y
INSOMNIA: Y
IRREGULAR HEARTBEATS: Y
FAST HEARTBEATS: Y
HEADACHES: Y
PELVIC PAIN: Y
TEMPLE PAIN: Y
ABDOMINAL PAIN: Y
EYE REDNESS: Y
DRY EYES: Y
MARK ALL THE AREAS OF PAIN: 107546665
JOINT INSTABILITY: Y
DIZZINESS: Y
NUMBNESS: Y
SINUS PAIN: Y
TINGLING: Y
BURNING: Y
VERTIGO: Y
BODY ACHES: Y
NASAL CONGESTION: Y
CHARACTERISTIC: SAME WITH ACTIVITY
WEAKNESS: Y
EAR PAIN: Y
DURATION: <30 MINS
HAIR SHEDDING: Y
DEPRESSION: Y
DRY COUGH: Y

## 2024-09-26 ENCOUNTER — OFFICE VISIT (OUTPATIENT)
Dept: RHEUMATOLOGY | Facility: MEDICAL CENTER | Age: 44
End: 2024-09-26
Attending: STUDENT IN AN ORGANIZED HEALTH CARE EDUCATION/TRAINING PROGRAM
Payer: COMMERCIAL

## 2024-09-26 VITALS
BODY MASS INDEX: 27.39 KG/M2 | HEART RATE: 85 BPM | SYSTOLIC BLOOD PRESSURE: 122 MMHG | WEIGHT: 164.38 LBS | TEMPERATURE: 97.7 F | OXYGEN SATURATION: 95 % | DIASTOLIC BLOOD PRESSURE: 82 MMHG | HEIGHT: 65 IN

## 2024-09-26 DIAGNOSIS — M07.60 CROHN'S RELATED ARTHRITIS (HCC): ICD-10-CM

## 2024-09-26 DIAGNOSIS — K50.90 CROHN'S RELATED ARTHRITIS (HCC): ICD-10-CM

## 2024-09-26 DIAGNOSIS — Z51.81 ENCOUNTER FOR MONITORING SULFASALAZINE THERAPY: ICD-10-CM

## 2024-09-26 DIAGNOSIS — Z79.899 ENCOUNTER FOR MONITORING SULFASALAZINE THERAPY: ICD-10-CM

## 2024-09-26 DIAGNOSIS — K50.119 CROHN'S DISEASE OF COLON, UNSPECIFIED COMPLICATION (HCC): ICD-10-CM

## 2024-09-26 PROCEDURE — 3074F SYST BP LT 130 MM HG: CPT | Performed by: STUDENT IN AN ORGANIZED HEALTH CARE EDUCATION/TRAINING PROGRAM

## 2024-09-26 PROCEDURE — 3079F DIAST BP 80-89 MM HG: CPT | Performed by: STUDENT IN AN ORGANIZED HEALTH CARE EDUCATION/TRAINING PROGRAM

## 2024-09-26 PROCEDURE — 99212 OFFICE O/P EST SF 10 MIN: CPT | Performed by: STUDENT IN AN ORGANIZED HEALTH CARE EDUCATION/TRAINING PROGRAM

## 2024-09-26 PROCEDURE — 99214 OFFICE O/P EST MOD 30 MIN: CPT | Performed by: STUDENT IN AN ORGANIZED HEALTH CARE EDUCATION/TRAINING PROGRAM

## 2024-09-26 RX ORDER — SULFASALAZINE 500 MG/1
1000 TABLET, DELAYED RELEASE ORAL 2 TIMES DAILY WITH MEALS
Qty: 360 TABLET | Refills: 3 | Status: SHIPPED | OUTPATIENT
Start: 2024-09-26

## 2024-09-26 ASSESSMENT — FIBROSIS 4 INDEX: FIB4 SCORE: 0.64

## 2024-09-26 NOTE — PATIENT INSTRUCTIONS
[facility]    Below are important guidelines to help you navigate your health care needs and assist us in caring for you safely and effectively. We encourage you to carefully read and understand this information and adhere to them accordingly.    [employer] Messaging and Phone Calls:  Diagnosis and Treatment - For a detailed explanation of your condition and treatment plan from today's visit, refer to the visit note on [employer] via the following steps:  Log in to [employer] and click on “Visits” at the top.  Scroll down to “Past Visits” under Appointments.  Click on “View Notes” under the appropriate visit date.  Questions or Concerns - MyChart messaging is for non-urgent matters that do not require immediate attention and should be brief with no more than two questions or concerns. If you have multiple questions or concerns, we ask that you schedule an appointment to have them properly addressed.  Response to Messages - [employer] messages are addressed throughout the week depending on clinical availability, so we ask that you allow up to one week for a response.  Phone Calls and Voicemails - Phone calls and voicemail messages are reserved for time-sensitive matters that cannot wait to be addressed via [employer]. We ask that you refrain from calling the office multiple times or leaving multiple voicemails regarding the same issue as doing so may lead to delays in response time.  Urgent Issues - For urgent medical matters or medical emergencies that cannot wait, you are advised to go to your nearest Urgent Care or Emergency Department for immediate attention.    Laboratory Tests and Imaging Studies:  Future Lab and Imaging Orders - We ask that you get your lab tests and imaging studies done no later than one week before your follow-up visit unless instructed otherwise.  Results Communication - You may see some test results marked as “abnormal” that are not necessarily significant or concerning. If  there are significant abnormalities on your test results that warrant further action, you will be notified via MyChart or phone call, otherwise they will be addressed at your follow-up visit.    Prescriptions and Refill Requests:  General Prescriptions (e.g. prednisone, hydroxychloroquine, leflunomide, methotrexate, etc.) - These are sent to Retail Pharmacies, so all refill requests of these medications should be directed to your local pharmacy.  Specialty Prescriptions (e.g. Enbrel, Humira, Cosentyx, Xeljanz, etc.) - These are sent to Specialty Pharmacies, so all refill requests of these medications should be directed to your designated specialty pharmacy.  Infusion Prescriptions (e.g. Remicade, Simponi Aria, Rituxan, Saphnelo, etc.) - These are sent to Outpatient Infusion Centers, so all scheduling requests of these medications should be directed to your local infusion center.    Medication Risks and Adverse Effects:  Immunosuppressed Status - Steroids and antirheumatic drugs are immunosuppressants, so they increase the risk of infections and can have side effects on various organ systems in your body, though most of them are uncommon.  Potential Side Effects - Be sure to read the drug package inserts to learn about the potential side effects of your medications before you start taking them and take them exactly as prescribed unless instructed otherwise.  In Case of Side Effects - If you experience any significant side effects, stop taking the medication immediately and promptly notify the prescriber. Depending on the severity of the side effects, consider going to an Urgent Care or Emergency Department for immediate attention.    Immunizations and Health Screening:  Vaccinations - If you are on immunosuppressive therapy, it is important that you are up to date on age-appropriate immunizations, particularly shingles and pneumonia vaccines, which you can request from your primary care provider or from us at your  next appointment.  Screening Tests - It is also important that you are up to date on age-appropriate screening tests, such as pap smear, mammography, and colonoscopy, which you can request from your primary care provider.    Educational and Supportive Resources:  [employer] [facility] ([email or web address]) - Visit our website to learn more about your condition and other rheumatic diseases, and gain access to many helpful resources for them.  Disposal of Old Medications (www.[place].gov/everyday-takeback-day) - Visit the Drug Enforcement Administration website to find a nearby location where you can properly dispose of old medications you no longer need.  Disposal of Used [place] (www.safeneedledisposal.org) - Visit the Safe Needle Disposal Organization website to find a nearby location where you can properly dispose of used needles from your injectable medications.

## 2024-10-03 ENCOUNTER — OCCUPATIONAL MEDICINE (OUTPATIENT)
Dept: URGENT CARE | Facility: PHYSICIAN GROUP | Age: 44
End: 2024-10-03
Payer: COMMERCIAL

## 2024-10-03 VITALS
HEART RATE: 97 BPM | RESPIRATION RATE: 14 BRPM | HEIGHT: 65 IN | SYSTOLIC BLOOD PRESSURE: 124 MMHG | DIASTOLIC BLOOD PRESSURE: 78 MMHG | OXYGEN SATURATION: 95 % | WEIGHT: 167 LBS | TEMPERATURE: 97.8 F | BODY MASS INDEX: 27.82 KG/M2

## 2024-10-03 DIAGNOSIS — S63.631D SPRAIN OF INTERPHALANGEAL JOINT OF LEFT INDEX FINGER, SUBSEQUENT ENCOUNTER: ICD-10-CM

## 2024-10-03 PROCEDURE — 3074F SYST BP LT 130 MM HG: CPT | Performed by: FAMILY MEDICINE

## 2024-10-03 PROCEDURE — 99213 OFFICE O/P EST LOW 20 MIN: CPT | Performed by: FAMILY MEDICINE

## 2024-10-03 PROCEDURE — 3078F DIAST BP <80 MM HG: CPT | Performed by: FAMILY MEDICINE

## 2024-10-03 ASSESSMENT — FIBROSIS 4 INDEX: FIB4 SCORE: 0.64

## 2024-10-14 ENCOUNTER — HOSPITAL ENCOUNTER (OUTPATIENT)
Dept: RADIOLOGY | Facility: MEDICAL CENTER | Age: 44
End: 2024-10-14
Attending: STUDENT IN AN ORGANIZED HEALTH CARE EDUCATION/TRAINING PROGRAM
Payer: COMMERCIAL

## 2024-10-14 DIAGNOSIS — Z12.31 VISIT FOR SCREENING MAMMOGRAM: ICD-10-CM

## 2024-10-14 PROCEDURE — 77067 SCR MAMMO BI INCL CAD: CPT

## 2024-10-16 ENCOUNTER — OFFICE VISIT (OUTPATIENT)
Dept: URGENT CARE | Facility: CLINIC | Age: 44
End: 2024-10-16
Payer: COMMERCIAL

## 2024-10-16 VITALS
RESPIRATION RATE: 14 BRPM | WEIGHT: 165.79 LBS | SYSTOLIC BLOOD PRESSURE: 122 MMHG | OXYGEN SATURATION: 94 % | BODY MASS INDEX: 27.62 KG/M2 | HEART RATE: 98 BPM | HEIGHT: 65 IN | TEMPERATURE: 96.6 F | DIASTOLIC BLOOD PRESSURE: 80 MMHG

## 2024-10-16 DIAGNOSIS — K50.10 CROHN'S DISEASE OF COLON WITHOUT COMPLICATION (HCC): ICD-10-CM

## 2024-10-16 DIAGNOSIS — R11.0 NAUSEA: ICD-10-CM

## 2024-10-16 DIAGNOSIS — R19.7 DIARRHEA DUE TO MALABSORPTION: ICD-10-CM

## 2024-10-16 DIAGNOSIS — K90.9 DIARRHEA DUE TO MALABSORPTION: ICD-10-CM

## 2024-10-16 PROCEDURE — 99214 OFFICE O/P EST MOD 30 MIN: CPT | Performed by: FAMILY MEDICINE

## 2024-10-16 PROCEDURE — 3079F DIAST BP 80-89 MM HG: CPT | Performed by: FAMILY MEDICINE

## 2024-10-16 PROCEDURE — 3074F SYST BP LT 130 MM HG: CPT | Performed by: FAMILY MEDICINE

## 2024-10-16 RX ORDER — ONDANSETRON 4 MG/1
4 TABLET, FILM COATED ORAL EVERY 4 HOURS PRN
Qty: 20 TABLET | Refills: 0 | Status: SHIPPED | OUTPATIENT
Start: 2024-10-16

## 2024-10-16 ASSESSMENT — ENCOUNTER SYMPTOMS
DIARRHEA: 1
CARDIOVASCULAR NEGATIVE: 1
BLOOD IN STOOL: 0
NAUSEA: 1
EYES NEGATIVE: 1
RESPIRATORY NEGATIVE: 1
ABDOMINAL PAIN: 1
MUSCULOSKELETAL NEGATIVE: 1
CONSTITUTIONAL NEGATIVE: 1

## 2024-10-16 ASSESSMENT — FIBROSIS 4 INDEX: FIB4 SCORE: 0.64

## 2024-10-23 ENCOUNTER — OCCUPATIONAL MEDICINE (OUTPATIENT)
Dept: URGENT CARE | Facility: CLINIC | Age: 44
End: 2024-10-23
Payer: COMMERCIAL

## 2024-10-23 VITALS
BODY MASS INDEX: 26.71 KG/M2 | HEIGHT: 66 IN | DIASTOLIC BLOOD PRESSURE: 60 MMHG | WEIGHT: 166.23 LBS | HEART RATE: 86 BPM | TEMPERATURE: 96.9 F | OXYGEN SATURATION: 96 % | RESPIRATION RATE: 14 BRPM | SYSTOLIC BLOOD PRESSURE: 114 MMHG

## 2024-10-23 DIAGNOSIS — S63.631D SPRAIN OF INTERPHALANGEAL JOINT OF LEFT INDEX FINGER, SUBSEQUENT ENCOUNTER: ICD-10-CM

## 2024-10-23 PROCEDURE — 3074F SYST BP LT 130 MM HG: CPT | Performed by: STUDENT IN AN ORGANIZED HEALTH CARE EDUCATION/TRAINING PROGRAM

## 2024-10-23 PROCEDURE — 99213 OFFICE O/P EST LOW 20 MIN: CPT | Performed by: STUDENT IN AN ORGANIZED HEALTH CARE EDUCATION/TRAINING PROGRAM

## 2024-10-23 PROCEDURE — 3078F DIAST BP <80 MM HG: CPT | Performed by: STUDENT IN AN ORGANIZED HEALTH CARE EDUCATION/TRAINING PROGRAM

## 2024-10-23 ASSESSMENT — FIBROSIS 4 INDEX: FIB4 SCORE: 0.64

## 2024-10-29 ENCOUNTER — OFFICE VISIT (OUTPATIENT)
Dept: URGENT CARE | Facility: PHYSICIAN GROUP | Age: 44
End: 2024-10-29
Payer: COMMERCIAL

## 2024-10-29 VITALS
OXYGEN SATURATION: 96 % | HEIGHT: 65 IN | HEART RATE: 96 BPM | TEMPERATURE: 97.2 F | SYSTOLIC BLOOD PRESSURE: 128 MMHG | BODY MASS INDEX: 27.66 KG/M2 | RESPIRATION RATE: 16 BRPM | WEIGHT: 166 LBS | DIASTOLIC BLOOD PRESSURE: 74 MMHG

## 2024-10-29 DIAGNOSIS — U07.1 COVID-19: Primary | ICD-10-CM

## 2024-10-29 DIAGNOSIS — R05.9 COUGH IN ADULT: ICD-10-CM

## 2024-10-29 RX ORDER — DEXTROMETHORPHAN HYDROBROMIDE AND PROMETHAZINE HYDROCHLORIDE 15; 6.25 MG/5ML; MG/5ML
5 SYRUP ORAL EVERY 4 HOURS PRN
Qty: 120 ML | Refills: 0 | Status: SHIPPED | OUTPATIENT
Start: 2024-10-29 | End: 2024-11-05

## 2024-10-29 ASSESSMENT — ENCOUNTER SYMPTOMS: COUGH: 1

## 2024-10-29 ASSESSMENT — FIBROSIS 4 INDEX: FIB4 SCORE: 0.64

## 2024-11-12 ENCOUNTER — PATIENT MESSAGE (OUTPATIENT)
Dept: RHEUMATOLOGY | Facility: MEDICAL CENTER | Age: 44
End: 2024-11-12

## 2024-11-12 ENCOUNTER — HOSPITAL ENCOUNTER (OUTPATIENT)
Dept: LAB | Facility: MEDICAL CENTER | Age: 44
End: 2024-11-12
Attending: STUDENT IN AN ORGANIZED HEALTH CARE EDUCATION/TRAINING PROGRAM
Payer: COMMERCIAL

## 2024-11-12 LAB
BASOPHILS # BLD AUTO: 0.9 % (ref 0–1.8)
BASOPHILS # BLD: 0.1 K/UL (ref 0–0.12)
EOSINOPHIL # BLD AUTO: 0.87 K/UL (ref 0–0.51)
EOSINOPHIL NFR BLD: 7.7 % (ref 0–6.9)
ERYTHROCYTE [DISTWIDTH] IN BLOOD BY AUTOMATED COUNT: 42.3 FL (ref 35.9–50)
EST. AVERAGE GLUCOSE BLD GHB EST-MCNC: 111 MG/DL
HBA1C MFR BLD: 5.5 % (ref 4–5.6)
HCT VFR BLD AUTO: 41.2 % (ref 37–47)
HGB BLD-MCNC: 13.9 G/DL (ref 12–16)
IMM GRANULOCYTES # BLD AUTO: 0.02 K/UL (ref 0–0.11)
IMM GRANULOCYTES NFR BLD AUTO: 0.2 % (ref 0–0.9)
LYMPHOCYTES # BLD AUTO: 2.77 K/UL (ref 1–4.8)
LYMPHOCYTES NFR BLD: 24.6 % (ref 22–41)
MCH RBC QN AUTO: 33 PG (ref 27–33)
MCHC RBC AUTO-ENTMCNC: 33.7 G/DL (ref 32.2–35.5)
MCV RBC AUTO: 97.9 FL (ref 81.4–97.8)
MONOCYTES # BLD AUTO: 0.76 K/UL (ref 0–0.85)
MONOCYTES NFR BLD AUTO: 6.8 % (ref 0–13.4)
NEUTROPHILS # BLD AUTO: 6.72 K/UL (ref 1.82–7.42)
NEUTROPHILS NFR BLD: 59.8 % (ref 44–72)
NRBC # BLD AUTO: 0 K/UL
NRBC BLD-RTO: 0 /100 WBC (ref 0–0.2)
PLATELET # BLD AUTO: 424 K/UL (ref 164–446)
PMV BLD AUTO: 9 FL (ref 9–12.9)
RBC # BLD AUTO: 4.21 M/UL (ref 4.2–5.4)
WBC # BLD AUTO: 11.2 K/UL (ref 4.8–10.8)

## 2024-11-12 PROCEDURE — 80053 COMPREHEN METABOLIC PANEL: CPT

## 2024-11-12 PROCEDURE — 84443 ASSAY THYROID STIM HORMONE: CPT

## 2024-11-12 PROCEDURE — 84403 ASSAY OF TOTAL TESTOSTERONE: CPT

## 2024-11-12 PROCEDURE — 36415 COLL VENOUS BLD VENIPUNCTURE: CPT

## 2024-11-12 PROCEDURE — 83036 HEMOGLOBIN GLYCOSYLATED A1C: CPT

## 2024-11-12 PROCEDURE — 80061 LIPID PANEL: CPT

## 2024-11-12 PROCEDURE — 83001 ASSAY OF GONADOTROPIN (FSH): CPT

## 2024-11-12 PROCEDURE — 82570 ASSAY OF URINE CREATININE: CPT

## 2024-11-12 PROCEDURE — 82043 UR ALBUMIN QUANTITATIVE: CPT

## 2024-11-12 PROCEDURE — 85025 COMPLETE CBC W/AUTO DIFF WBC: CPT

## 2024-11-12 PROCEDURE — 82670 ASSAY OF TOTAL ESTRADIOL: CPT

## 2024-11-12 PROCEDURE — 83002 ASSAY OF GONADOTROPIN (LH): CPT

## 2024-11-13 LAB
ALBUMIN SERPL BCP-MCNC: 4.7 G/DL (ref 3.2–4.9)
ALBUMIN/GLOB SERPL: 1.7 G/DL
ALP SERPL-CCNC: 58 U/L (ref 30–99)
ALT SERPL-CCNC: 42 U/L (ref 2–50)
ANION GAP SERPL CALC-SCNC: 12 MMOL/L (ref 7–16)
AST SERPL-CCNC: 31 U/L (ref 12–45)
BILIRUB SERPL-MCNC: 0.5 MG/DL (ref 0.1–1.5)
BUN SERPL-MCNC: 13 MG/DL (ref 8–22)
CALCIUM ALBUM COR SERPL-MCNC: 9 MG/DL (ref 8.5–10.5)
CALCIUM SERPL-MCNC: 9.6 MG/DL (ref 8.5–10.5)
CHLORIDE SERPL-SCNC: 104 MMOL/L (ref 96–112)
CHOLEST SERPL-MCNC: 196 MG/DL (ref 100–199)
CO2 SERPL-SCNC: 22 MMOL/L (ref 20–33)
CREAT SERPL-MCNC: 0.84 MG/DL (ref 0.5–1.4)
ESTRADIOL SERPL-MCNC: 409 PG/ML
FASTING STATUS PATIENT QL REPORTED: NORMAL
FSH SERPL-ACNC: 5.8 MIU/ML
GFR SERPLBLD CREATININE-BSD FMLA CKD-EPI: 87 ML/MIN/1.73 M 2
GLOBULIN SER CALC-MCNC: 2.7 G/DL (ref 1.9–3.5)
GLUCOSE SERPL-MCNC: 107 MG/DL (ref 65–99)
HDLC SERPL-MCNC: 46 MG/DL
LDLC SERPL CALC-MCNC: 127 MG/DL
LH SERPL-ACNC: 7.1 IU/L
POTASSIUM SERPL-SCNC: 3.8 MMOL/L (ref 3.6–5.5)
PROT SERPL-MCNC: 7.4 G/DL (ref 6–8.2)
SODIUM SERPL-SCNC: 138 MMOL/L (ref 135–145)
TRIGL SERPL-MCNC: 113 MG/DL (ref 0–149)
TSH SERPL DL<=0.005 MIU/L-ACNC: 1.81 UIU/ML (ref 0.38–5.33)

## 2024-11-14 LAB
COLLECT DURATION TIME SPEC: NORMAL HR
CREAT 24H UR-MCNC: 426 MG/DL
MICROALBUMIN 24H UR-MCNC: 4.3 MG/DL
MICROALBUMIN/CREAT 24H UR: 10 MG/G (ref 0–30)
SPECIMEN VOL ?TM UR: NORMAL ML

## 2024-11-15 NOTE — PROGRESS NOTES
Her condition does not amount to disability as it can be effectively treated by optimizing her regimen, so I cannot write such a letter. If she wants work-related accommodation, then she can request such a form from her employer and schedule an appointment for me to complete it for her.    Saurabh Paris M.D.

## 2024-11-16 LAB — TESTOST SERPL-MCNC: 16 NG/DL (ref 9–55)

## 2024-12-09 ENCOUNTER — HOSPITAL ENCOUNTER (OUTPATIENT)
Dept: LAB | Facility: MEDICAL CENTER | Age: 44
End: 2024-12-09
Payer: COMMERCIAL

## 2024-12-09 LAB
BASOPHILS # BLD AUTO: 0.9 % (ref 0–1.8)
BASOPHILS # BLD: 0.06 K/UL (ref 0–0.12)
CRP SERPL HS-MCNC: <0.3 MG/DL (ref 0–0.75)
EOSINOPHIL # BLD AUTO: 0.98 K/UL (ref 0–0.51)
EOSINOPHIL NFR BLD: 14.1 % (ref 0–6.9)
ERYTHROCYTE [DISTWIDTH] IN BLOOD BY AUTOMATED COUNT: 43.8 FL (ref 35.9–50)
FERRITIN SERPL-MCNC: 61 NG/ML (ref 10–291)
HCT VFR BLD AUTO: 40.7 % (ref 37–47)
HGB BLD-MCNC: 13.9 G/DL (ref 12–16)
IMM GRANULOCYTES # BLD AUTO: 0.01 K/UL (ref 0–0.11)
IMM GRANULOCYTES NFR BLD AUTO: 0.1 % (ref 0–0.9)
IRON SATN MFR SERPL: 43 % (ref 15–55)
IRON SERPL-MCNC: 141 UG/DL (ref 40–170)
LYMPHOCYTES # BLD AUTO: 2.42 K/UL (ref 1–4.8)
LYMPHOCYTES NFR BLD: 34.8 % (ref 22–41)
MCH RBC QN AUTO: 33.7 PG (ref 27–33)
MCHC RBC AUTO-ENTMCNC: 34.2 G/DL (ref 32.2–35.5)
MCV RBC AUTO: 98.8 FL (ref 81.4–97.8)
MONOCYTES # BLD AUTO: 0.44 K/UL (ref 0–0.85)
MONOCYTES NFR BLD AUTO: 6.3 % (ref 0–13.4)
NEUTROPHILS # BLD AUTO: 3.04 K/UL (ref 1.82–7.42)
NEUTROPHILS NFR BLD: 43.8 % (ref 44–72)
NRBC # BLD AUTO: 0 K/UL
NRBC BLD-RTO: 0 /100 WBC (ref 0–0.2)
PLATELET # BLD AUTO: 391 K/UL (ref 164–446)
PMV BLD AUTO: 9.1 FL (ref 9–12.9)
RBC # BLD AUTO: 4.12 M/UL (ref 4.2–5.4)
TIBC SERPL-MCNC: 325 UG/DL (ref 250–450)
UIBC SERPL-MCNC: 184 UG/DL (ref 110–370)
WBC # BLD AUTO: 7 K/UL (ref 4.8–10.8)

## 2024-12-09 PROCEDURE — 82728 ASSAY OF FERRITIN: CPT

## 2024-12-09 PROCEDURE — 86140 C-REACTIVE PROTEIN: CPT

## 2024-12-09 PROCEDURE — 83540 ASSAY OF IRON: CPT

## 2024-12-09 PROCEDURE — 85025 COMPLETE CBC W/AUTO DIFF WBC: CPT

## 2024-12-09 PROCEDURE — 83550 IRON BINDING TEST: CPT

## 2024-12-09 PROCEDURE — 36415 COLL VENOUS BLD VENIPUNCTURE: CPT

## 2024-12-10 ENCOUNTER — HOSPITAL ENCOUNTER (OUTPATIENT)
Facility: MEDICAL CENTER | Age: 44
End: 2024-12-10
Payer: COMMERCIAL

## 2024-12-10 PROCEDURE — 83993 ASSAY FOR CALPROTECTIN FECAL: CPT

## 2024-12-14 LAB — CALPROTECTIN STL-MCNT: 27 UG/G

## 2024-12-30 ENCOUNTER — HOSPITAL ENCOUNTER (OUTPATIENT)
Dept: LAB | Facility: MEDICAL CENTER | Age: 44
End: 2024-12-30
Attending: ALLERGY & IMMUNOLOGY
Payer: COMMERCIAL

## 2024-12-30 PROCEDURE — 82785 ASSAY OF IGE: CPT

## 2024-12-30 PROCEDURE — 36415 COLL VENOUS BLD VENIPUNCTURE: CPT

## 2024-12-30 PROCEDURE — 83520 IMMUNOASSAY QUANT NOS NONAB: CPT

## 2024-12-30 PROCEDURE — 82784 ASSAY IGA/IGD/IGG/IGM EACH: CPT

## 2025-01-01 LAB
IGA SERPL-MCNC: 118 MG/DL (ref 68–408)
IGE SERPL-ACNC: 846 KU/L
IGG SERPL-MCNC: 818 MG/DL (ref 768–1632)
IGM SERPL-MCNC: 131 MG/DL (ref 35–263)
TRYPTASE SERPL-MCNC: 4.3 UG/L

## 2025-01-15 ENCOUNTER — HOSPITAL ENCOUNTER (OUTPATIENT)
Dept: LAB | Facility: MEDICAL CENTER | Age: 45
End: 2025-01-15
Attending: STUDENT IN AN ORGANIZED HEALTH CARE EDUCATION/TRAINING PROGRAM
Payer: COMMERCIAL

## 2025-01-15 LAB
BASOPHILS # BLD AUTO: 0.6 % (ref 0–1.8)
BASOPHILS # BLD: 0.04 K/UL (ref 0–0.12)
EOSINOPHIL # BLD AUTO: 0.64 K/UL (ref 0–0.51)
EOSINOPHIL NFR BLD: 9.3 % (ref 0–6.9)
ERYTHROCYTE [DISTWIDTH] IN BLOOD BY AUTOMATED COUNT: 43 FL (ref 35.9–50)
HCT VFR BLD AUTO: 36.9 % (ref 37–47)
HGB BLD-MCNC: 12.7 G/DL (ref 12–16)
IMM GRANULOCYTES # BLD AUTO: 0.01 K/UL (ref 0–0.11)
IMM GRANULOCYTES NFR BLD AUTO: 0.1 % (ref 0–0.9)
LYMPHOCYTES # BLD AUTO: 3.07 K/UL (ref 1–4.8)
LYMPHOCYTES NFR BLD: 44.8 % (ref 22–41)
MCH RBC QN AUTO: 34.5 PG (ref 27–33)
MCHC RBC AUTO-ENTMCNC: 34.4 G/DL (ref 32.2–35.5)
MCV RBC AUTO: 100.3 FL (ref 81.4–97.8)
MONOCYTES # BLD AUTO: 0.51 K/UL (ref 0–0.85)
MONOCYTES NFR BLD AUTO: 7.4 % (ref 0–13.4)
NEUTROPHILS # BLD AUTO: 2.59 K/UL (ref 1.82–7.42)
NEUTROPHILS NFR BLD: 37.8 % (ref 44–72)
NRBC # BLD AUTO: 0 K/UL
NRBC BLD-RTO: 0 /100 WBC (ref 0–0.2)
PLATELET # BLD AUTO: 336 K/UL (ref 164–446)
PMV BLD AUTO: 9.1 FL (ref 9–12.9)
RBC # BLD AUTO: 3.68 M/UL (ref 4.2–5.4)
WBC # BLD AUTO: 6.9 K/UL (ref 4.8–10.8)

## 2025-01-15 PROCEDURE — 80053 COMPREHEN METABOLIC PANEL: CPT

## 2025-01-15 PROCEDURE — 85025 COMPLETE CBC W/AUTO DIFF WBC: CPT

## 2025-01-15 PROCEDURE — 36415 COLL VENOUS BLD VENIPUNCTURE: CPT

## 2025-01-15 PROCEDURE — 82670 ASSAY OF TOTAL ESTRADIOL: CPT

## 2025-01-15 PROCEDURE — 84403 ASSAY OF TOTAL TESTOSTERONE: CPT

## 2025-01-16 LAB
ALBUMIN SERPL BCP-MCNC: 4.4 G/DL (ref 3.2–4.9)
ALBUMIN/GLOB SERPL: 1.8 G/DL
ALP SERPL-CCNC: 72 U/L (ref 30–99)
ALT SERPL-CCNC: 35 U/L (ref 2–50)
ANION GAP SERPL CALC-SCNC: 10 MMOL/L (ref 7–16)
AST SERPL-CCNC: 28 U/L (ref 12–45)
BILIRUB SERPL-MCNC: <0.2 MG/DL (ref 0.1–1.5)
BUN SERPL-MCNC: 15 MG/DL (ref 8–22)
CALCIUM ALBUM COR SERPL-MCNC: 9 MG/DL (ref 8.5–10.5)
CALCIUM SERPL-MCNC: 9.3 MG/DL (ref 8.5–10.5)
CHLORIDE SERPL-SCNC: 102 MMOL/L (ref 96–112)
CO2 SERPL-SCNC: 24 MMOL/L (ref 20–33)
CREAT SERPL-MCNC: 0.77 MG/DL (ref 0.5–1.4)
ESTRADIOL SERPL-MCNC: 311 PG/ML
GFR SERPLBLD CREATININE-BSD FMLA CKD-EPI: 96 ML/MIN/1.73 M 2
GLOBULIN SER CALC-MCNC: 2.4 G/DL (ref 1.9–3.5)
GLUCOSE SERPL-MCNC: 89 MG/DL (ref 65–99)
POTASSIUM SERPL-SCNC: 3.8 MMOL/L (ref 3.6–5.5)
PROT SERPL-MCNC: 6.8 G/DL (ref 6–8.2)
SODIUM SERPL-SCNC: 136 MMOL/L (ref 135–145)

## 2025-01-19 LAB — TESTOST SERPL-MCNC: 14 NG/DL (ref 9–55)

## 2025-01-22 ENCOUNTER — HOSPITAL ENCOUNTER (OUTPATIENT)
Dept: LAB | Facility: MEDICAL CENTER | Age: 45
End: 2025-01-22
Attending: STUDENT IN AN ORGANIZED HEALTH CARE EDUCATION/TRAINING PROGRAM
Payer: COMMERCIAL

## 2025-01-22 DIAGNOSIS — Z51.81 ENCOUNTER FOR MONITORING SULFASALAZINE THERAPY: ICD-10-CM

## 2025-01-22 DIAGNOSIS — K50.119 CROHN'S DISEASE OF COLON, UNSPECIFIED COMPLICATION (HCC): ICD-10-CM

## 2025-01-22 DIAGNOSIS — M07.60 CROHN'S RELATED ARTHRITIS (HCC): ICD-10-CM

## 2025-01-22 DIAGNOSIS — K50.90 CROHN'S RELATED ARTHRITIS (HCC): ICD-10-CM

## 2025-01-22 DIAGNOSIS — Z79.899 ENCOUNTER FOR MONITORING SULFASALAZINE THERAPY: ICD-10-CM

## 2025-01-22 LAB
ALBUMIN SERPL BCP-MCNC: 4.5 G/DL (ref 3.2–4.9)
ALBUMIN/GLOB SERPL: 1.8 G/DL
ALP SERPL-CCNC: 54 U/L (ref 30–99)
ALT SERPL-CCNC: 28 U/L (ref 2–50)
ANION GAP SERPL CALC-SCNC: 13 MMOL/L (ref 7–16)
AST SERPL-CCNC: 23 U/L (ref 12–45)
BASOPHILS # BLD AUTO: 0.9 % (ref 0–1.8)
BASOPHILS # BLD: 0.06 K/UL (ref 0–0.12)
BILIRUB SERPL-MCNC: 0.3 MG/DL (ref 0.1–1.5)
BUN SERPL-MCNC: 9 MG/DL (ref 8–22)
CALCIUM ALBUM COR SERPL-MCNC: 8.8 MG/DL (ref 8.5–10.5)
CALCIUM SERPL-MCNC: 9.2 MG/DL (ref 8.5–10.5)
CHLORIDE SERPL-SCNC: 102 MMOL/L (ref 96–112)
CO2 SERPL-SCNC: 24 MMOL/L (ref 20–33)
CREAT SERPL-MCNC: 0.73 MG/DL (ref 0.5–1.4)
CRP SERPL HS-MCNC: <0.3 MG/DL (ref 0–0.75)
EOSINOPHIL # BLD AUTO: 0.62 K/UL (ref 0–0.51)
EOSINOPHIL NFR BLD: 9.2 % (ref 0–6.9)
ERYTHROCYTE [DISTWIDTH] IN BLOOD BY AUTOMATED COUNT: 43.2 FL (ref 35.9–50)
ERYTHROCYTE [SEDIMENTATION RATE] IN BLOOD BY WESTERGREN METHOD: 8 MM/HOUR (ref 0–25)
GFR SERPLBLD CREATININE-BSD FMLA CKD-EPI: 103 ML/MIN/1.73 M 2
GLOBULIN SER CALC-MCNC: 2.5 G/DL (ref 1.9–3.5)
GLUCOSE SERPL-MCNC: 104 MG/DL (ref 65–99)
HCT VFR BLD AUTO: 39.2 % (ref 37–47)
HGB BLD-MCNC: 13.4 G/DL (ref 12–16)
IMM GRANULOCYTES # BLD AUTO: 0.03 K/UL (ref 0–0.11)
IMM GRANULOCYTES NFR BLD AUTO: 0.4 % (ref 0–0.9)
LACTOFERRIN STL QL IA: NEGATIVE
LYMPHOCYTES # BLD AUTO: 2.35 K/UL (ref 1–4.8)
LYMPHOCYTES NFR BLD: 34.9 % (ref 22–41)
MCH RBC QN AUTO: 34.4 PG (ref 27–33)
MCHC RBC AUTO-ENTMCNC: 34.2 G/DL (ref 32.2–35.5)
MCV RBC AUTO: 100.5 FL (ref 81.4–97.8)
MONOCYTES # BLD AUTO: 0.52 K/UL (ref 0–0.85)
MONOCYTES NFR BLD AUTO: 7.7 % (ref 0–13.4)
NEUTROPHILS # BLD AUTO: 3.16 K/UL (ref 1.82–7.42)
NEUTROPHILS NFR BLD: 46.9 % (ref 44–72)
NRBC # BLD AUTO: 0 K/UL
NRBC BLD-RTO: 0 /100 WBC (ref 0–0.2)
PLATELET # BLD AUTO: 355 K/UL (ref 164–446)
PMV BLD AUTO: 9.1 FL (ref 9–12.9)
POTASSIUM SERPL-SCNC: 3.8 MMOL/L (ref 3.6–5.5)
PROT SERPL-MCNC: 7 G/DL (ref 6–8.2)
RBC # BLD AUTO: 3.9 M/UL (ref 4.2–5.4)
SODIUM SERPL-SCNC: 139 MMOL/L (ref 135–145)
WBC # BLD AUTO: 6.7 K/UL (ref 4.8–10.8)

## 2025-01-22 PROCEDURE — 83630 LACTOFERRIN FECAL (QUAL): CPT

## 2025-01-22 PROCEDURE — 85652 RBC SED RATE AUTOMATED: CPT

## 2025-01-22 PROCEDURE — 80053 COMPREHEN METABOLIC PANEL: CPT

## 2025-01-22 PROCEDURE — 86140 C-REACTIVE PROTEIN: CPT

## 2025-01-22 PROCEDURE — 85025 COMPLETE CBC W/AUTO DIFF WBC: CPT

## 2025-01-22 PROCEDURE — 83993 ASSAY FOR CALPROTECTIN FECAL: CPT

## 2025-01-22 PROCEDURE — 36415 COLL VENOUS BLD VENIPUNCTURE: CPT

## 2025-01-24 LAB — CALPROTECTIN STL-MCNT: 60 UG/G

## 2025-01-27 ENCOUNTER — APPOINTMENT (OUTPATIENT)
Dept: RHEUMATOLOGY | Facility: MEDICAL CENTER | Age: 45
End: 2025-01-27
Attending: STUDENT IN AN ORGANIZED HEALTH CARE EDUCATION/TRAINING PROGRAM
Payer: COMMERCIAL

## 2025-01-28 ASSESSMENT — RHEUMATOLOGY FOLLOW-UP QUESTIONNAIRE
EYE PAIN: Y
MARK ALL THE AREAS OF PAIN: 113720265
TEMPLE PAIN: Y
IRREGULAR HEARTBEATS: Y
PERSONAL OR EMOTIONAL STRESSORS: DEPRESSION
DRY MOUTH: Y
HEADACHES: Y
SINUS PAIN: Y
CHARACTERISTIC: SAME WITH ACTIVITY
NUMBNESS: Y
FAST HEARTBEATS: Y
HEARING LOSS: Y
SNORING: Y
PELVIC PAIN: Y
DURATION: <30 MINS
JOINT PAIN: SAME WITH ACTIVITY
RINGING IN EARS: Y
ANXIETY: Y
INSOMNIA: Y
DEPRESSION: Y
HEARTBURN: Y
BLURRY VISION: Y
COUGH WITH BLOODY PHLEGM: Y
DRY EYES: Y
NASAL CONGESTION: Y
DRY COUGH: Y
WEAKNESS: Y
TINGLING: Y
DIZZINESS: Y
VERTIGO: Y
CHIEF_COMPLAINT: 3 MONTH CHECK
ABDOMINAL PAIN: Y
RATE_1TO10: 9
EYE REDNESS: Y

## 2025-01-29 ENCOUNTER — TELEPHONE (OUTPATIENT)
Dept: RHEUMATOLOGY | Facility: MEDICAL CENTER | Age: 45
End: 2025-01-29

## 2025-01-29 ENCOUNTER — HOSPITAL ENCOUNTER (OUTPATIENT)
Dept: RADIOLOGY | Facility: MEDICAL CENTER | Age: 45
End: 2025-01-29
Attending: STUDENT IN AN ORGANIZED HEALTH CARE EDUCATION/TRAINING PROGRAM
Payer: COMMERCIAL

## 2025-01-29 ENCOUNTER — OFFICE VISIT (OUTPATIENT)
Dept: RHEUMATOLOGY | Facility: MEDICAL CENTER | Age: 45
End: 2025-01-29
Attending: STUDENT IN AN ORGANIZED HEALTH CARE EDUCATION/TRAINING PROGRAM
Payer: COMMERCIAL

## 2025-01-29 ENCOUNTER — PATIENT MESSAGE (OUTPATIENT)
Dept: RHEUMATOLOGY | Facility: MEDICAL CENTER | Age: 45
End: 2025-01-29

## 2025-01-29 VITALS
TEMPERATURE: 97.2 F | HEIGHT: 65 IN | DIASTOLIC BLOOD PRESSURE: 82 MMHG | BODY MASS INDEX: 28.35 KG/M2 | WEIGHT: 170.13 LBS | OXYGEN SATURATION: 96 % | HEART RATE: 89 BPM | SYSTOLIC BLOOD PRESSURE: 136 MMHG

## 2025-01-29 DIAGNOSIS — J45.40 MODERATE PERSISTENT ALLERGIC ASTHMA: ICD-10-CM

## 2025-01-29 DIAGNOSIS — Z51.81 ENCOUNTER FOR MONITORING SULFASALAZINE THERAPY: ICD-10-CM

## 2025-01-29 DIAGNOSIS — H20.9 UVEITIS OF BOTH EYES: ICD-10-CM

## 2025-01-29 DIAGNOSIS — K50.90 CROHN'S RELATED ARTHRITIS (HCC): ICD-10-CM

## 2025-01-29 DIAGNOSIS — K50.119 CROHN'S DISEASE OF COLON, UNSPECIFIED COMPLICATION (HCC): ICD-10-CM

## 2025-01-29 DIAGNOSIS — Z79.899 ENCOUNTER FOR MONITORING SULFASALAZINE THERAPY: ICD-10-CM

## 2025-01-29 DIAGNOSIS — M54.50 LUMBAR BACK PAIN: ICD-10-CM

## 2025-01-29 DIAGNOSIS — M07.60 CROHN'S RELATED ARTHRITIS (HCC): ICD-10-CM

## 2025-01-29 PROCEDURE — 99212 OFFICE O/P EST SF 10 MIN: CPT | Performed by: STUDENT IN AN ORGANIZED HEALTH CARE EDUCATION/TRAINING PROGRAM

## 2025-01-29 PROCEDURE — 72100 X-RAY EXAM L-S SPINE 2/3 VWS: CPT

## 2025-01-29 RX ORDER — OMALIZUMAB 150 MG/ML
INJECTION, SOLUTION SUBCUTANEOUS
COMMUNITY

## 2025-01-29 RX ORDER — ADALIMUMAB 40MG/0.4ML
40 KIT SUBCUTANEOUS
Qty: 2 EACH | Refills: 5 | Status: SHIPPED | OUTPATIENT
Start: 2025-01-29

## 2025-01-29 ASSESSMENT — FIBROSIS 4 INDEX: FIB4 SCORE: 0.55

## 2025-01-29 NOTE — TELEPHONE ENCOUNTER
Started PA for Humira but Amjevita is preferred so I submitted a PA for Amjevita to avoid any delay.      Prior Authorization for Amjevita 40MG/0.4ML auto-injectors (Quantity: 2, Days: 28) has been submitted via Cover My Meds: Key ([id number])    Insurance: [employer]    Will follow up in 24-48 business hours.

## 2025-01-29 NOTE — PROGRESS NOTES
[facility] RHEUMATOLOGY  [address], [address], [place] [address]  Phone: [phone] ? Fax: [phone]  [facility]/Health-Services/Rheumatology    FOLLOW-UP VISIT NOTE         Subjective     DATE OF SERVICE: 01/29/2025    PRIMARY CARE PROVIDER:  Abdon Sanchez II, M.D.  [address] [address]    PATIENT IDENTIFICATION:  Gina Little  [address] [address]    YOB: 1980    MEDICAL RECORD NUMBER: [id number]         CHIEF COMPLAINT:   Chief Complaint   Patient presents with    Follow-Up     Crohn's related arthritis (HCC)       RHEUMATOLOGIC HISTORY:  Gina Little is a 44 y.o. female with pertinent history notable for Crohn's-related arthritis diagnosed in 6/2024, Crohn's disease diagnosed in 3/2024 based on colonoscopy/biopsy at [facility] (reportedly symptomatic since 2021), moderate persistent allergic asthma, and multiple food/environmental allergies among other comorbidities. She initially presented on 6/12/24 for evaluation of unexplained polyarthralgia in the setting of her Crohn's disease that raised concern for inflammatory arthritis. Reported onset in 2021 with variable but progressive course of symptoms including joint pain that started in her hips, then neck/upper and lower back, and subsequently ankles, feet, wrists and hands, less than 30 minutes of morning stiffness, worsening pain on physical activity, dry eyes with pain/redness, and fatigue among multiple symptoms. She had been managing with Tylenol Arthritis with modest relief, but experienced more significant improvement following initiation of sulfasalazine.     Pertinent treatments: Prednisone 20 mg taper for asthma flare (avoids due to severe palpitations), sulfasalazine 1000 mg QD>BID (started 3/2024, dose increased 9/26/24-present, effective), Xolair 150 mg SC every 2 weeks (-present, effective).     Pertinent positive labs: Positive fecal calprotectin  51<80<1670 with negative<positive fecal lactoferrin (in 9/2024<5/2024<1/2024); high IgE 1343 with multiple food/environmental allergies (in 8/2023); low WBC 4.5 and ANC 1.42, and high AEC 0.57 (in 5/2024).     Pertinent negative labs: Negative RF, anti-CCP, HLA-B27, KINGS, anti-centromere B, C3, C4, CH50, and vitamin D (in 8/2023), ANCA, anti-MPO, anti-PR3, anti-GBM, HAV, HCV, and TSH (in 12/2023), HBV, QTB, and TPMT (in 5/2024), anti-CarP, CRP, ESR, eGFR, creatinine, LFTs, and acceptable CBC (in 9/2024).     Pertinent XR imaging: Cervical spine (in 6/2024), Hips/pelvis (in 6/2024), and Left hand (in 9/2024) with no evidence of inflammatory or degenerative arthropathy.      INTERVAL HISTORY:  Reports interval history as noted on the questionnaire below or scanned under media tab.  [facility] Established Patient History Form    1/28/2025 10:34 AM PST - Filed by Patient   MAIN REASON FOR VISIT 3 month check   INTERVAL HISTORY OF ILLNESS   Date of worsening onset: 2019   Preceding incident/ailment:    Describe/list your symptoms: Lower back, lumbar section been in alot of oain, hips pain ankels, weist and hands and arms.   Exacerbating factors:    Alleviating factors:    Helpful medications: My crohns and Tylenol arthritis   Ineffective medications:    Severity of pain (scale of 1-10): 9   Personal/emotional stressors: Depression   Alex All The Areas Of Pain    REVIEW OF SYMPTOMS    General   Fevers    Chills    Night sweats    Malaise    Fatigue    Unintentional weight loss    Musculoskeletal   Joint pain Same with activity   Morning stiffness duration <30 mins   Morning stiffness characteristic Same with activity   Joint swelling    Joint instability    Tendon pain    Muscle pain    Body aches    Dermatologic   Hair loss with bald spots    Hair shedding    Skin thickening    Skin plaques    Sunlight-induced skin rash    Cold-induced color changes (white, purple, red on rewarming)    Neurologic/Psychiatric    Weakness Yes   Spasms    Tingling Yes   Burning    Numbness Yes   Insomnia Yes   Anxiety Yes   Depression Yes   Head/Eyes   Headaches Yes   Temple pain Yes   Dizziness Yes   Dry eyes Yes   Eye pain Yes   Eye redness Yes   Blurry vision Yes   Vision loss    Ears/Nose   Ear pain    Ringing in ears Yes   Vertigo Yes   Hearing loss Yes   Nasal ulcers    Nosebleeds    Sinus pain Yes   Nasal congestion Yes   Snoring Yes   Mouth/Throat   Oral ulcers    Bleeding gums    Dry mouth Yes   Cavities    Sore throat    Sticking in throat    Difficulty speaking    Neck/Lymphatics   Thyroid pain    Thyroid swelling    Lymph node swelling    Cardiac/Respiratory   Chest pain with breathing    Dry cough Yes   Cough with bloody phlegm Yes   Shortness of breath    Fast heartbeats Yes   Irregular heartbeats Yes   Gastrointestinal   Nausea    Vomiting    Difficulty swallowing    Heartburn Yes   Abdominal pain Yes   Bloody stool    Mucus stool    Genitourinary   Pelvic pain Yes   Genital ulcers    Abnormal discharge    Burning urination    Frothy urine    Blood in urine        REVIEW OF SYSTEMS:  Except as noted in the history above, relevant review of systems with emphasis on autoimmune rheumatic diseases was otherwise negative.      CURRENT PROBLEM LIST:  Patient Active Problem List    Diagnosis Date Noted    Lumbar back pain 01/29/2025    Uveitis of both eyes 01/29/2025    Crohn's related arthritis (HCC) 06/12/2024    Moderate persistent allergic asthma 06/12/2024    Encounter for monitoring sulfasalazine therapy 06/12/2024    Crohn's disease of colon, unspecified complication (HCC) 06/12/2024    Palpitations 07/21/2022       PAST MEDICAL HISTORY:  Past Medical History:   Diagnosis Date    Anesthesia 07/29/2022    pt with strong history of motion sickness    Arrhythmia     Arthritis 07/29/2022    osteo bilateral hips    Asthma 07/29/2022    uses inhalers daily    Crohn's disease (HCC)     Endometriosis 09/16/2022    Fatty liver      Gynecological disorder 09/16/2022    Infectious disease 07/29/2022    7/15/22 pt tested positive for covid with sxs.  As of 7/29/22 pt still has sxs of congestion, cough, loss of voice.  Pt tested negative 7/28/22 at home.  Dr. Morales's office notified.    Palpitations     Pneumonia 07/29/2022    AGE 10    PONV (postoperative nausea and vomiting) 07/29/2022    pt with strong history of motion sickness    PONV (postoperative nausea and vomiting) 09/16/2022    Patient states, Severe PONV With Surgeries.       PAST SURGICAL HISTORY:  Past Surgical History:   Procedure Laterality Date    OTHER ORTHOPEDIC SURGERY Left     Surgery for dislocated left knee cap.    OTHER ORTHOPEDIC SURGERY Right     Right Shoulder Tendon Surgery.    TONSILLECTOMY      Age 35    TUBAL LIGATION      With IUD Removal, Age 31.       SOCIAL HISTORY:  Social History     Socioeconomic History    Marital status: Single     Spouse name: Not on file    Number of children: Not on file    Years of education: Not on file    Highest education level: Not on file   Occupational History    Not on file   Tobacco Use    Smoking status: Never     Passive exposure: Never    Smokeless tobacco: Never   Vaping Use    Vaping status: Never Used   Substance and Sexual Activity    Alcohol use: Never    Drug use: Never    Sexual activity: Not on file   Other Topics Concern    Not on file   Social History Narrative    Not on file     Social Drivers of Health     Financial Resource Strain: Not on file   Food Insecurity: Not on file   Transportation Needs: Not on file   Physical Activity: Not on file   Stress: Not on file   Social Connections: Not on file   Intimate Partner Violence: Not on file   Housing Stability: Not on file       FAMILY HISTORY:  History reviewed. No pertinent family history.    MEDICATIONS:  Current Outpatient Medications   Medication Sig    Omalizumab (XOLAIR) 150 MG/ML Solution Auto-injector Inject  under the skin.    Adalimumab (HUMIRA, 2 PEN,) 40  "MG/0.4ML Auto-injector Kit Inject 40 mg under the skin every 14 days.    sulfaSALAzine (AZULFIDINE EN-TAB) 500 MG EC tablet Take 2 Tablets by mouth 2 times a day with meals.    fluticasone-salmeterol (ADVAIR) 250-50 MCG/ACT AEROSOL POWDER, BREATH ACTIVATED INHALE 1 DOSE BY MOUTH TWICE DAILY    Azelastine (ASTELIN) 137 MCG/SPRAY Solution 2 Sprays 2 times a day.    SPIRIVA RESPIMAT 2.5 MCG/ACT Aero Soln INHALE 2 SPRAY(S) BY MOUTH ONCE DAILY    fluticasone (FLONASE) 50 MCG/ACT nasal spray USE 1 TO 2 SPRAY(S) IN EACH NOSTRIL ONCE DAILY    traZODone (DESYREL) 50 MG Tab TAKE 2 TABLETS BY MOUTH ONCE DAILY AT BEDTIME FOR SLEEP    folic acid (FOLVITE) 1 MG Tab Take 1 mg by mouth every day.    albuterol 108 (90 Base) MCG/ACT Aero Soln inhalation aerosol Inhale 1 Puff.    VENTOLIN  (90 Base) MCG/ACT Aero Soln inhalation aerosol INHALE 1 TO 2 PUFFS BY MOUTH EVERY 4 TO 6 HOURS AS NEEDED    diphenhydrAMINE HCl (BENADRYL ALLERGY PO) Take  by mouth.    Multiple Vitamins-Minerals (MULTIPLE VITAMINS/WOMENS PO) Take  by mouth every day.    amLODIPine (NORVASC) 5 MG Tab Take 5 mg by mouth every day.    metoprolol SR (TOPROL XL) 25 MG TABLET SR 24 HR Take 25 mg by mouth every day.       ALLERGIES:   Allergies   Allergen Reactions    Hydrocodone-Acetaminophen Vomiting and Nausea     VICODIN    Morphine Vomiting and Nausea    Penicillin G Hives and Shortness of Breath     Reaction At AGE 10  Pt. States Was Given For Pneumonia.     Other Misc Nausea    Penicillins      Other Reaction(s): Not available       IMMUNIZATIONS:  Immunization History   Administered Date(s) Administered    Influenza Seasonal Injectable - Historical Data 11/01/2023    Influenza Vaccine Quad Inj (Pf) 09/16/2022, 09/11/2023            Objective     Vital Signs: /82 (BP Location: Left arm, Patient Position: Sitting, BP Cuff Size: Adult)   Pulse 89   Temp 36.2 °C (97.2 °F) (Temporal)   Ht 1.651 m (5' 5\")   Wt 77.2 kg (170 lb 2 oz)   SpO2 96% Body " mass index is 28.31 kg/m².    General: Appears well and comfortable  Eyes: No scleral or conjunctival lesions  ENT: No apparent oral, nasal, or ear lesions  Head/Neck: No apparent scalp or neck lesions  Cardiovascular: Regular rate and rhythm  Respiratory: Breathing quiet and unlabored  Gastrointestinal: No apparent organomegaly or abdominal masses  Integumentary: No significant cutaneous lesions or dyspigmentation  Musculoskeletal: Mild to moderate tenderness of lumbar spine with restricted forward flexion to approximately 75 degrees (unable to touch toes); no significant peripheral joint tenderness, swelling, warmth, erythema, or overt synovitis  Neurologic: No focal sensory or motor deficits  Psychiatric: Mood and affect appropriate      LABORATORY RESULTS REVIEWED AND INTERPRETED:  Lab Results   Component Value Date/Time    CREACTPROT <0.30 01/22/2025 10:53 AM    SEDRATEWES 8 01/22/2025 10:53 AM    URICACID 6.3 06/09/2023 08:48 AM     Lab Results   Component Value Date/Time    G1XGHNIJGQX 175.6 08/29/2023 07:16 AM    D1RPGQXRUEY 28.2 08/29/2023 07:16 AM     Lab Results   Component Value Date/Time    RHEUMFACTN <10 08/29/2023 07:16 AM    CCPANTIBODY 3 06/09/2023 08:48 AM    NQXV05CWOV See Note 08/29/2023 07:16 AM     Lab Results   Component Value Date/Time    ANTINUCAB None Detected 08/29/2023 07:16 AM     Lab Results   Component Value Date/Time    ANTIDNADS SEE BELOW 08/29/2023 07:16 AM    SMITHAB 2 08/29/2023 07:16 AM    RNPAB 2 08/29/2023 07:16 AM    CENTROMBAB 1 08/29/2023 07:16 AM    SSA60 2 08/29/2023 07:16 AM    SSA52 4 08/29/2023 07:16 AM    ANTISSBSJ 1 08/29/2023 07:16 AM     Lab Results   Component Value Date/Time    GBMABG Negative 12/07/2023 08:39 AM     Lab Results   Component Value Date/Time     12/30/2024 07:41 AM     12/30/2024 07:41 AM     Lab Results   Component Value Date/Time    TSHULTRASEN 1.810 11/12/2024 06:28 AM     Lab Results   Component Value Date/Time    25HYDROXY 41  08/29/2023 07:16 AM    PTHINTACT 47.4 04/25/2023 08:19 AM     Lab Results   Component Value Date/Time    FERRITIN 61.0 12/09/2024 06:15 AM    IRON 141 12/09/2024 06:15 AM    TRANSFERRIN 286 02/10/2023 03:00 PM     Lab Results   Component Value Date/Time    WBC 6.7 01/22/2025 10:53 AM    RBC 3.90 (L) 01/22/2025 10:53 AM    HEMOGLOBIN 13.4 01/22/2025 10:53 AM    HEMATOCRIT 39.2 01/22/2025 10:53 AM    .5 (H) 01/22/2025 10:53 AM    MCH 34.4 (H) 01/22/2025 10:53 AM    MCHC 34.2 01/22/2025 10:53 AM    RDW 43.2 01/22/2025 10:53 AM    PLATELETCT 355 01/22/2025 10:53 AM    MPV 9.1 01/22/2025 10:53 AM    NEUTS 3.16 01/22/2025 10:53 AM    LYMPHOCYTES 34.90 01/22/2025 10:53 AM    MONOCYTES 7.70 01/22/2025 10:53 AM    EOSINOPHILS 9.20 (H) 01/22/2025 10:53 AM    BASOPHILS 0.90 01/22/2025 10:53 AM     Lab Results   Component Value Date/Time    ASTSGOT 23 01/22/2025 10:53 AM    ALTSGPT 28 01/22/2025 10:53 AM    ALKPHOSPHAT 54 01/22/2025 10:53 AM    TBILIRUBIN 0.3 01/22/2025 10:53 AM    TOTPROTEIN 7.0 01/22/2025 10:53 AM    ALBUMIN 4.5 01/22/2025 10:53 AM     Lab Results   Component Value Date/Time    SODIUM 139 01/22/2025 10:53 AM    POTASSIUM 3.8 01/22/2025 10:53 AM    CHLORIDE 102 01/22/2025 10:53 AM    CO2 24 01/22/2025 10:53 AM    GLUCOSE 104 (H) 01/22/2025 10:53 AM    BUN 9 01/22/2025 10:53 AM    CREATININE 0.73 01/22/2025 10:53 AM    BUNCREATRAT 15 08/26/2022 04:23 AM    CALCIUM 9.2 01/22/2025 10:53 AM    MAGNESIUM 1.9 08/26/2022 04:23 AM     Lab Results   Component Value Date/Time    TOTALVOLUME Random 11/12/2024 06:28 AM    MICROALBUR 4.3 11/12/2024 06:28 AM    CREATININEU 1156 04/27/2023 05:30 AM    MALBCRT 10 11/12/2024 06:28 AM     Lab Results   Component Value Date/Time    COLORURINE Yellow 04/15/2024 02:02 PM    SPECGRAVITY 1.017 04/15/2024 02:02 PM    PHURINE 7.0 04/15/2024 02:02 PM    GLUCOSEUR Negative 04/15/2024 02:02 PM    KETONES Negative 04/15/2024 02:02 PM    PROTEINURIN Negative 04/15/2024 02:02 PM      Lab Results   Component Value Date/Time    HEPBSAG Non-Reactive 05/21/2024 07:11 AM    HEPBCORIGM Non-Reactive 12/07/2023 08:39 AM    HEPCAB Non-Reactive 12/07/2023 08:39 AM     Lab Results   Component Value Date/Time    CHOLSTRLTOT 196 11/12/2024 06:28 AM     (H) 11/12/2024 06:28 AM    HDL 46 11/12/2024 06:28 AM    TRIGLYCERIDE 113 11/12/2024 06:28 AM    HBA1C 5.5 11/12/2024 06:28 AM       RADIOLOGY RESULTS REVIEWED AND INTERPRETED:  Results for orders placed in visit on 01/24/23    DX-ANKLE 3+ VIEWS LEFT    Impression  No evidence of fracture or dislocation.    Results for orders placed in visit on 09/19/24    DX-FINGER(S) 2+ LEFT    Impression  Negative frontal view of the hand and index finger series    Results for orders placed during the hospital encounter of 06/24/24    DX-CERVICAL SPINE-2 OR 3 VIEWS    Impression  Unremarkable cervical spine.    Results for orders placed during the hospital encounter of 05/19/23    DS-BONE DENSITY STUDY (DEXA)    Impression  According to the World Health Organization classification, bone mineral density of this patient is normal in the lumbar spine and left femur.    10-year Probability of Fracture:  Major Osteoporotic     1.2%  Hip     0.0%  Population      [place] ()    Based on left femur neck BMD      All relevant laboratory and imaging results reported on this note were reviewed and interpreted by me.         Assessment & Plan     Gina Little is a 44 y.o. female with history and physical as noted above whose presentation merits the following clinical impressions and recommendations:    1. Crohn's related arthritis (HCC)  Peripheral joint arthritis much improved, but she appears to have a fluctuating clinical course with regard to her lower back pain, so in the setting of her fluctuating Crohn's disease activity and apparent uveitis, need to escalate her treatment to biologic therapy with Humira.  - CRP QUANTITIVE (NON-CARDIAC); Future  - Sed  Rate; Future  - Adalimumab (HUMIRA, 2 PEN,) 40 MG/0.4ML Auto-injector Kit; Inject 40 mg under the skin every 14 days.  Dispense: 2 Each; Refill: 5  - Continue sulfasalazine 1000 mg twice daily    2. 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  - DX-LUMBAR SPINE-2 OR 3 VIEWS; Future    3. Uveitis of both eyes  Presumptive diagnosis based on her reported finding by her ophthalmologist showing significant inflammation in both eyes.  - Adalimumab (HUMIRA, 2 PEN,) 40 MG/0.4ML Auto-injector Kit; Inject 40 mg under the skin every 14 days.  Dispense: 2 Each; Refill: 5    4. Crohn's disease of colon, unspecified complication (HCC)  Fluctuating clinical course despite ongoing treatment with sulfasalazine, so need to escalate her treatment to biologic therapy with Humira.  - Adalimumab (HUMIRA, 2 PEN,) 40 MG/0.4ML Auto-injector Kit; Inject 40 mg under the skin every 14 days.  Dispense: 2 Each; Refill: 5  - Continue sulfasalazine 1000 mg twice daily as above    5. Moderate persistent allergic asthma,  Clinically much improved following initiation of Xolair by Allergy/Immunology.  - Continue Xolair 150 mg SC every 2 weeks per Allergy/Immunology    6. Encounter for monitoring sulfasalazine therapy  High risk immunomodulator use requiring routine monitoring labs prior to every visit to assess for possible side effects including but not limited to myelotoxicity, hepatotoxicity, and nephrotoxicity.  - CBC WITH DIFFERENTIAL; Future  - Comp Metabolic Panel; Future      The above assessment and plan were discussed with the patient who acknowledged understanding of the plan.    FOLLOW-UP: Return in about 4 months (around 5/29/2025) for Short.         Thank you for the opportunity to participate in the care of Gina Little.    Saurabh Paris MD, MS, FACR  Rheumatologist, [facility], [facility]  Assistant  Professor of Clinical Medicine, Department of Internal Medicine  [employer]

## 2025-01-29 NOTE — PATIENT INSTRUCTIONS
[facility]    Below are important guidelines to help you navigate your health care needs and assist us in caring for you safely and effectively. We encourage you to carefully read and understand this information and adhere to them accordingly.    [employer] Messaging and Phone Calls:  Diagnosis and Treatment - For a detailed explanation of your condition and treatment plan from today's visit, refer to the visit note on [employer] via the following steps:  Log in to [employer] and click on “Visits” at the top.  Scroll down to “Past Visits” under Appointments.  Click on “View Notes” under the appropriate visit date.  Questions or Concerns - MyChart messaging is for non-urgent matters that do not require immediate attention and should be brief with no more than two questions or concerns. If you have multiple questions or concerns, we ask that you schedule an appointment to have them properly addressed.  Response to Messages - [employer] messages are addressed throughout the week depending on clinical availability, so we ask that you allow up to one week for a response.  Phone Calls and Voicemails - Phone calls and voicemail messages are reserved for time-sensitive matters that cannot wait to be addressed via [employer]. We ask that you refrain from calling the office multiple times or leaving multiple voicemails regarding the same issue as doing so may lead to delays in response time.  Urgent Issues - For urgent medical matters or medical emergencies that cannot wait, you are advised to go to your nearest Urgent Care or Emergency Department for immediate attention.    Laboratory Tests and Imaging Studies:  Future Lab and Imaging Orders - We ask that you get your lab tests and imaging studies done no later than one week before your follow-up visit unless instructed otherwise.  Results Communication - You may see some test results marked as “abnormal” that are not necessarily significant or concerning. If  there are significant abnormalities on your test results that warrant further action, you will be notified via MyChart or phone call, otherwise they will be addressed at your follow-up visit.    Prescriptions and Refill Requests:  General Prescriptions (e.g. prednisone, hydroxychloroquine, leflunomide, methotrexate, etc.) - These are sent to Retail Pharmacies, so all refill requests of these medications should be directed to your local pharmacy.  Specialty Prescriptions (e.g. Enbrel, Humira, Cosentyx, Xeljanz, etc.) - These are sent to Specialty Pharmacies, so all refill requests of these medications should be directed to your designated specialty pharmacy.  Infusion Prescriptions (e.g. Remicade, Simponi Aria, Rituxan, Saphnelo, etc.) - These are sent to Outpatient Infusion Centers, so all scheduling requests of these medications should be directed to your local infusion center.    Medication Risks and Adverse Effects:  Immunosuppressed Status - Steroids and antirheumatic drugs are immunosuppressants, so they increase the risk of infections and can have side effects on various organ systems in your body, though most of them are uncommon.  Potential Side Effects - Be sure to read the drug package inserts to learn about the potential side effects of your medications before you start taking them and take them exactly as prescribed unless instructed otherwise.  In Case of Side Effects - If you experience any significant side effects, stop taking the medication immediately and promptly notify the prescriber. Depending on the severity of the side effects, consider going to an Urgent Care or Emergency Department for immediate attention.    Immunizations and Health Screening:  Vaccinations - If you are on immunosuppressive therapy, it is important that you are up to date on age-appropriate immunizations, particularly shingles and pneumonia vaccines, which you can request from your primary care provider or from us at your  next appointment.  Screening Tests - It is also important that you are up to date on age-appropriate screening tests, such as pap smear, mammography, and colonoscopy, which you can request from your primary care provider.    Educational and Supportive Resources:  [employer] [facility] ([email or web address]) - Visit our website to learn more about your condition and other rheumatic diseases, and gain access to many helpful resources for them.  Disposal of Old Medications (www.[place].gov/everyday-takeback-day) - Visit the Drug Enforcement Administration website to find a nearby location where you can properly dispose of old medications you no longer need.  Disposal of Used Bee (www.safeneedledisposal.org) - Visit the Safe Needle Disposal Organization website to find a nearby location where you can properly dispose of used needles from your injectable medications.

## 2025-01-30 NOTE — TELEPHONE ENCOUNTER
"PA denied for Amjevita. I called insurance to see why it was denied and it was because I put \"other\" as the diagnosis and listed all of the diagnosis. (Crohn's related arthritis (HCC) [M07.60, K50.90]; Uveitis of both eyes [H20.9]; Crohn's disease of colon, unspecified complication (HCC) [K50.119] )     I clarified that Amjevita is preferred so I resubmitted PA and chose moderately to severe active Crohn's disease as the diagnosis.    There is no option for Crohn's related arthritis.    "

## 2025-01-30 NOTE — TELEPHONE ENCOUNTER
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    KU    "

## 2025-01-30 NOTE — TELEPHONE ENCOUNTER
Thanks for the guidance! I resubmitted.    Prior Authorization for Amjevita 40MG/0.4ML auto-injectors (Quantity: 2, Days: 28) has been submitted via Cover My Meds: Key ([id number])    Insurance: [employer]    Will follow up in 24-48 business hours.

## 2025-01-31 NOTE — TELEPHONE ENCOUNTER
Prior Authorization for Amjevita 40MG/0.4ML auto-injectors  has been denied for a quantity of 2 , day supply 28    Prior authorization was denied per the following:       Prior Authorization denial reference number: [id number]  Insurance: [employer]      --Dr. Paris, how would you like to proceed?

## 2025-03-04 ENCOUNTER — HOSPITAL ENCOUNTER (OUTPATIENT)
Dept: LAB | Facility: MEDICAL CENTER | Age: 45
End: 2025-03-04
Payer: COMMERCIAL

## 2025-03-04 LAB
ALBUMIN SERPL BCP-MCNC: 4.5 G/DL (ref 3.2–4.9)
ALBUMIN/GLOB SERPL: 1.6 G/DL
ALP SERPL-CCNC: 59 U/L (ref 30–99)
ALT SERPL-CCNC: 49 U/L (ref 2–50)
ANION GAP SERPL CALC-SCNC: 11 MMOL/L (ref 7–16)
AST SERPL-CCNC: 32 U/L (ref 12–45)
BASOPHILS # BLD AUTO: 0.8 % (ref 0–1.8)
BASOPHILS # BLD: 0.05 K/UL (ref 0–0.12)
BILIRUB SERPL-MCNC: 0.2 MG/DL (ref 0.1–1.5)
BUN SERPL-MCNC: 13 MG/DL (ref 8–22)
CALCIUM ALBUM COR SERPL-MCNC: 9.1 MG/DL (ref 8.5–10.5)
CALCIUM SERPL-MCNC: 9.5 MG/DL (ref 8.5–10.5)
CHLORIDE SERPL-SCNC: 103 MMOL/L (ref 96–112)
CO2 SERPL-SCNC: 23 MMOL/L (ref 20–33)
CREAT SERPL-MCNC: 0.81 MG/DL (ref 0.5–1.4)
CRP SERPL HS-MCNC: <0.3 MG/DL (ref 0–0.75)
EOSINOPHIL # BLD AUTO: 0.49 K/UL (ref 0–0.51)
EOSINOPHIL NFR BLD: 7.8 % (ref 0–6.9)
ERYTHROCYTE [DISTWIDTH] IN BLOOD BY AUTOMATED COUNT: 40.9 FL (ref 35.9–50)
GFR SERPLBLD CREATININE-BSD FMLA CKD-EPI: 91 ML/MIN/1.73 M 2
GLOBULIN SER CALC-MCNC: 2.8 G/DL (ref 1.9–3.5)
GLUCOSE SERPL-MCNC: 87 MG/DL (ref 65–99)
HCT VFR BLD AUTO: 38 % (ref 37–47)
HGB BLD-MCNC: 13.2 G/DL (ref 12–16)
IMM GRANULOCYTES # BLD AUTO: 0.01 K/UL (ref 0–0.11)
IMM GRANULOCYTES NFR BLD AUTO: 0.2 % (ref 0–0.9)
LYMPHOCYTES # BLD AUTO: 2.76 K/UL (ref 1–4.8)
LYMPHOCYTES NFR BLD: 43.9 % (ref 22–41)
MCH RBC QN AUTO: 34.5 PG (ref 27–33)
MCHC RBC AUTO-ENTMCNC: 34.7 G/DL (ref 32.2–35.5)
MCV RBC AUTO: 99.2 FL (ref 81.4–97.8)
MONOCYTES # BLD AUTO: 0.48 K/UL (ref 0–0.85)
MONOCYTES NFR BLD AUTO: 7.6 % (ref 0–13.4)
NEUTROPHILS # BLD AUTO: 2.5 K/UL (ref 1.82–7.42)
NEUTROPHILS NFR BLD: 39.7 % (ref 44–72)
NRBC # BLD AUTO: 0 K/UL
NRBC BLD-RTO: 0 /100 WBC (ref 0–0.2)
PLATELET # BLD AUTO: 331 K/UL (ref 164–446)
PMV BLD AUTO: 9.2 FL (ref 9–12.9)
POTASSIUM SERPL-SCNC: 3.7 MMOL/L (ref 3.6–5.5)
PROT SERPL-MCNC: 7.3 G/DL (ref 6–8.2)
RBC # BLD AUTO: 3.83 M/UL (ref 4.2–5.4)
SODIUM SERPL-SCNC: 137 MMOL/L (ref 135–145)
WBC # BLD AUTO: 6.3 K/UL (ref 4.8–10.8)

## 2025-03-04 PROCEDURE — 80053 COMPREHEN METABOLIC PANEL: CPT

## 2025-03-04 PROCEDURE — 85025 COMPLETE CBC W/AUTO DIFF WBC: CPT

## 2025-03-04 PROCEDURE — 86140 C-REACTIVE PROTEIN: CPT

## 2025-03-04 PROCEDURE — 36415 COLL VENOUS BLD VENIPUNCTURE: CPT

## 2025-03-05 ENCOUNTER — HOSPITAL ENCOUNTER (OUTPATIENT)
Facility: MEDICAL CENTER | Age: 45
End: 2025-03-05
Payer: COMMERCIAL

## 2025-03-05 PROCEDURE — 83993 ASSAY FOR CALPROTECTIN FECAL: CPT

## 2025-03-08 LAB — CALPROTECTIN STL-MCNT: 7 UG/G

## 2025-05-20 ENCOUNTER — HOSPITAL ENCOUNTER (OUTPATIENT)
Dept: LAB | Facility: MEDICAL CENTER | Age: 45
End: 2025-05-20
Attending: STUDENT IN AN ORGANIZED HEALTH CARE EDUCATION/TRAINING PROGRAM
Payer: COMMERCIAL

## 2025-05-20 DIAGNOSIS — K50.90 CROHN'S RELATED ARTHRITIS (HCC): ICD-10-CM

## 2025-05-20 DIAGNOSIS — Z51.81 ENCOUNTER FOR MONITORING SULFASALAZINE THERAPY: ICD-10-CM

## 2025-05-20 DIAGNOSIS — M07.60 CROHN'S RELATED ARTHRITIS (HCC): ICD-10-CM

## 2025-05-20 DIAGNOSIS — Z79.899 ENCOUNTER FOR MONITORING SULFASALAZINE THERAPY: ICD-10-CM

## 2025-05-20 LAB
ALBUMIN SERPL BCP-MCNC: 4.7 G/DL (ref 3.2–4.9)
ALBUMIN/GLOB SERPL: 1.7 G/DL
ALP SERPL-CCNC: 60 U/L (ref 30–99)
ALT SERPL-CCNC: 31 U/L (ref 2–50)
ANION GAP SERPL CALC-SCNC: 12 MMOL/L (ref 7–16)
AST SERPL-CCNC: 27 U/L (ref 12–45)
BASOPHILS # BLD AUTO: 0.8 % (ref 0–1.8)
BASOPHILS # BLD: 0.04 K/UL (ref 0–0.12)
BILIRUB SERPL-MCNC: 0.5 MG/DL (ref 0.1–1.5)
BUN SERPL-MCNC: 12 MG/DL (ref 8–22)
CALCIUM ALBUM COR SERPL-MCNC: 8.9 MG/DL (ref 8.5–10.5)
CALCIUM SERPL-MCNC: 9.5 MG/DL (ref 8.5–10.5)
CHLORIDE SERPL-SCNC: 104 MMOL/L (ref 96–112)
CO2 SERPL-SCNC: 24 MMOL/L (ref 20–33)
CREAT SERPL-MCNC: 0.9 MG/DL (ref 0.5–1.4)
CRP SERPL HS-MCNC: <0.3 MG/DL (ref 0–0.75)
EOSINOPHIL # BLD AUTO: 0.55 K/UL (ref 0–0.51)
EOSINOPHIL NFR BLD: 10.8 % (ref 0–6.9)
ERYTHROCYTE [DISTWIDTH] IN BLOOD BY AUTOMATED COUNT: 42.7 FL (ref 35.9–50)
ERYTHROCYTE [SEDIMENTATION RATE] IN BLOOD BY WESTERGREN METHOD: 10 MM/HOUR (ref 0–25)
GFR SERPLBLD CREATININE-BSD FMLA CKD-EPI: 80 ML/MIN/1.73 M 2
GLOBULIN SER CALC-MCNC: 2.7 G/DL (ref 1.9–3.5)
GLUCOSE SERPL-MCNC: 101 MG/DL (ref 65–99)
HCT VFR BLD AUTO: 40.6 % (ref 37–47)
HGB BLD-MCNC: 13.8 G/DL (ref 12–16)
IMM GRANULOCYTES # BLD AUTO: 0 K/UL (ref 0–0.11)
IMM GRANULOCYTES NFR BLD AUTO: 0 % (ref 0–0.9)
LYMPHOCYTES # BLD AUTO: 2.27 K/UL (ref 1–4.8)
LYMPHOCYTES NFR BLD: 44.8 % (ref 22–41)
MCH RBC QN AUTO: 34.1 PG (ref 27–33)
MCHC RBC AUTO-ENTMCNC: 34 G/DL (ref 32.2–35.5)
MCV RBC AUTO: 100.2 FL (ref 81.4–97.8)
MONOCYTES # BLD AUTO: 0.4 K/UL (ref 0–0.85)
MONOCYTES NFR BLD AUTO: 7.9 % (ref 0–13.4)
NEUTROPHILS # BLD AUTO: 1.81 K/UL (ref 1.82–7.42)
NEUTROPHILS NFR BLD: 35.7 % (ref 44–72)
NRBC # BLD AUTO: 0 K/UL
NRBC BLD-RTO: 0 /100 WBC (ref 0–0.2)
PLATELET # BLD AUTO: 304 K/UL (ref 164–446)
PMV BLD AUTO: 9.2 FL (ref 9–12.9)
POTASSIUM SERPL-SCNC: 4 MMOL/L (ref 3.6–5.5)
PROT SERPL-MCNC: 7.4 G/DL (ref 6–8.2)
RBC # BLD AUTO: 4.05 M/UL (ref 4.2–5.4)
SODIUM SERPL-SCNC: 140 MMOL/L (ref 135–145)
WBC # BLD AUTO: 5.1 K/UL (ref 4.8–10.8)

## 2025-05-20 PROCEDURE — 36415 COLL VENOUS BLD VENIPUNCTURE: CPT

## 2025-05-20 PROCEDURE — 80053 COMPREHEN METABOLIC PANEL: CPT

## 2025-05-20 PROCEDURE — 85025 COMPLETE CBC W/AUTO DIFF WBC: CPT

## 2025-05-20 PROCEDURE — 86140 C-REACTIVE PROTEIN: CPT

## 2025-05-20 PROCEDURE — 85652 RBC SED RATE AUTOMATED: CPT

## 2025-05-27 ENCOUNTER — OFFICE VISIT (OUTPATIENT)
Dept: RHEUMATOLOGY | Facility: MEDICAL CENTER | Age: 45
End: 2025-05-27
Attending: STUDENT IN AN ORGANIZED HEALTH CARE EDUCATION/TRAINING PROGRAM
Payer: COMMERCIAL

## 2025-05-27 ENCOUNTER — TELEPHONE (OUTPATIENT)
Dept: RHEUMATOLOGY | Facility: MEDICAL CENTER | Age: 45
End: 2025-05-27

## 2025-05-27 ENCOUNTER — PATIENT MESSAGE (OUTPATIENT)
Dept: PHARMACY | Facility: MEDICAL CENTER | Age: 45
End: 2025-05-27

## 2025-05-27 VITALS
HEART RATE: 79 BPM | HEIGHT: 65 IN | SYSTOLIC BLOOD PRESSURE: 138 MMHG | DIASTOLIC BLOOD PRESSURE: 88 MMHG | RESPIRATION RATE: 14 BRPM | OXYGEN SATURATION: 94 % | TEMPERATURE: 97.6 F | WEIGHT: 163 LBS | BODY MASS INDEX: 27.16 KG/M2

## 2025-05-27 DIAGNOSIS — J45.40 MODERATE PERSISTENT ALLERGIC ASTHMA: ICD-10-CM

## 2025-05-27 DIAGNOSIS — K50.119 CROHN'S DISEASE OF COLON, UNSPECIFIED COMPLICATION (HCC): ICD-10-CM

## 2025-05-27 DIAGNOSIS — Z51.81 ENCOUNTER FOR MONITORING SULFASALAZINE THERAPY: ICD-10-CM

## 2025-05-27 DIAGNOSIS — H20.9 UVEITIS OF BOTH EYES: ICD-10-CM

## 2025-05-27 DIAGNOSIS — K50.90 CROHN'S RELATED ARTHRITIS (HCC): Primary | ICD-10-CM

## 2025-05-27 DIAGNOSIS — R53.82 CHRONIC FATIGUE, UNSPECIFIED: ICD-10-CM

## 2025-05-27 DIAGNOSIS — M07.60 CROHN'S RELATED ARTHRITIS (HCC): Primary | ICD-10-CM

## 2025-05-27 DIAGNOSIS — Z79.899 ENCOUNTER FOR MONITORING SULFASALAZINE THERAPY: ICD-10-CM

## 2025-05-27 PROCEDURE — 3079F DIAST BP 80-89 MM HG: CPT | Performed by: STUDENT IN AN ORGANIZED HEALTH CARE EDUCATION/TRAINING PROGRAM

## 2025-05-27 PROCEDURE — 99214 OFFICE O/P EST MOD 30 MIN: CPT | Performed by: STUDENT IN AN ORGANIZED HEALTH CARE EDUCATION/TRAINING PROGRAM

## 2025-05-27 PROCEDURE — 3075F SYST BP GE 130 - 139MM HG: CPT | Performed by: STUDENT IN AN ORGANIZED HEALTH CARE EDUCATION/TRAINING PROGRAM

## 2025-05-27 RX ORDER — ADALIMUMAB 4 MG/ML
KIT INJECTION
Qty: 2 EACH | Refills: 0 | Status: SHIPPED | OUTPATIENT
Start: 2025-05-27

## 2025-05-27 ASSESSMENT — RHEUMATOLOGY FOLLOW-UP QUESTIONNAIRE
EYE REDNESS: Y
RINGING IN EARS: Y
IRREGULAR HEARTBEATS: Y
SNORING: Y
ANXIETY: Y
WEAKNESS: Y
DRY EYES: Y
ABDOMINAL PAIN: Y
VISION LOSS: Y
HEARTBURN: Y
EYE PAIN: Y
SINUS PAIN: Y
RATE_1TO10: 9
JOINT PAIN: SAME WITH ACTIVITY
HELPFUL_MEDICATIONS: SULFASALAZINE
TINGLING: Y
INSOMNIA: Y
DIZZINESS: Y
HEARING LOSS: Y
HEADACHES: Y
TEMPLE PAIN: Y
VERTIGO: Y
CHARACTERISTIC: SAME WITH ACTIVITY
FAST HEARTBEATS: Y
DEPRESSION: Y
PRECEDING INCIDENT OR AILMENT: 2019
CHIEF_COMPLAINT: 6 MONTH CHECK
BLURRY VISION: Y
FEVERS: Y
MARK ALL THE AREAS OF PAIN: 120132060
NASAL CONGESTION: Y

## 2025-05-27 ASSESSMENT — FIBROSIS 4 INDEX: FIB4 SCORE: 0.73

## 2025-05-27 ASSESSMENT — PATIENT HEALTH QUESTIONNAIRE - PHQ9: CLINICAL INTERPRETATION OF PHQ2 SCORE: 0

## 2025-05-27 NOTE — TELEPHONE ENCOUNTER
Prior Authorization for Humira Pen-Psor/Uveit Starter (CF) 80 MG/0.8ML &40MG/0.4ML auto-injector kit has been approved for a quantity of 3 , day supply 28    Prior Authorization reference number: [id number]  Insurance: [employer]um  Effective dates: 05/27/2025 to 05/27/2026  Copay: $0     Is patient eligible to fill with [facility]? No, test claim rejected stating Must fill with [facility].    Spoke with patient to let her know her prescription was being sent to [facility].

## 2025-05-27 NOTE — PROGRESS NOTES
(Additional 2    [facility] RHEUMATOLOGY  [address], [address], [place] [address]  Phone: [phone] ? Fax: [phone]  [facility]/Health-Services/Rheumatology    FOLLOW-UP VISIT NOTE         Subjective     DATE OF SERVICE: 05/27/2025    PRIMARY CARE PROVIDER:  Abdon Sanchez II, M.D.  [address] [address]    PATIENT IDENTIFICATION:  Gina Little  [address] [address]    YOB: 1980    MEDICAL RECORD NUMBER: [id number]         CHIEF COMPLAINT:   Chief Complaint   Patient presents with    Follow-Up     Crohn's related arthritis (HCC)       RHEUMATOLOGIC HISTORY:  Gina Little is a 44 y.o. female with pertinent history notable for Crohn's-related arthritis diagnosed in 6/2024 preceded by Crohn's disease diagnosed in 3/2024 based on colonoscopy/biopsy at [facility] (reportedly symptomatic since 2021), uveitis of both eyes diagnosed in 10/2024, moderate persistent allergic asthma, and multiple food/environmental allergies among other comorbidities. She initially presented on 6/12/24 for evaluation of unexplained polyarthralgia in the setting of her Crohn's disease that raised concern for inflammatory arthritis. Reported onset in 2021 with variable but progressive course of symptoms including joint pain that started in her hips, then neck/upper and lower back, and subsequently ankles, feet, wrists and hands, less than 30 minutes of morning stiffness, worsening pain on physical activity, dry eyes with pain/redness, and fatigue among multiple symptoms. She had been managing with Tylenol Arthritis with modest relief, but experienced more significant improvement following initiation of sulfasalazine.     Pertinent treatments: Prednisone 20 mg taper for asthma flare (avoids due to severe palpitations), sulfasalazine 1000 mg QD>BID (started 3/2024, dose increased 9/26/24-present, effective), Xolair 150 mg SC every 2 weeks (-present,  effective).     Pertinent positive labs: Positive fecal calprotectin 51<80<1670 with negative<positive fecal lactoferrin (in 9/2024<5/2024<1/2024); high IgE 1343 with multiple food/environmental allergies (in 8/2023); low WBC 4.5 and ANC 1.42, and high AEC 0.57 (in 5/2024).     Pertinent negative labs: Negative RF, anti-CCP, HLA-B27, KINGS, anti-centromere B, C3, C4, CH50, and vitamin D (in 8/2023), ANCA, anti-MPO, anti-PR3, anti-GBM, HAV, HCV, and TSH (in 12/2023), HBV, QTB, and TPMT (in 5/2024), anti-CarP (in 9/2024), fecal lactoferrin (in 1/2025), fecal calprotectin (in 3/2025), CRP, ESR, eGFR, creatinine, and LFTs (in 5/2024).     Pertinent XR imaging: Cervical spine (in 6/2024), Hips/pelvis (in 6/2024), and Left hand (in 9/2024) with no evidence of inflammatory or degenerative arthropathy.      INTERVAL HISTORY:  Reports interval history as noted on the questionnaire below or scanned under media tab.  [facility] Established Patient History Form    5/27/2025  9:00 AM PDT - Filed by Patient   MAIN REASON FOR VISIT 6 month check   INTERVAL HISTORY OF ILLNESS   Date of worsening onset: Four years ago   Preceding incident/ailment: 2019   Describe/list your symptoms: Pain in my bones and joints, eyes so bad , i feel depleted, extreme fatigue.   Exacerbating factors:    Alleviating factors:    Helpful medications: SULFASALAZINE   Ineffective medications: Eye medications dont work   Severity of pain (scale of 1-10): 9   Personal/emotional stressors: Depression and stresses over all health   Aelx All The Areas Of Pain    REVIEW OF SYMPTOMS    General   Fevers Yes   Chills    Night sweats    Malaise    Fatigue    Unintentional weight loss    Musculoskeletal   Joint pain Same with activity   Morning stiffness duration    Morning stiffness characteristic Same with activity   Joint swelling    Joint instability    Tendon pain    Muscle pain    Body aches    Dermatologic   Hair loss with bald spots    Hair shedding     Skin thickening    Skin plaques    Sunlight-induced skin rash    Cold-induced color changes (white, purple, red on rewarming)    Neurologic/Psychiatric   Weakness Yes   Spasms    Tingling Yes   Burning    Numbness    Insomnia Yes   Anxiety Yes   Depression Yes   Head/Eyes   Headaches Yes   Temple pain Yes   Dizziness Yes   Dry eyes Yes   Eye pain Yes   Eye redness Yes   Blurry vision Yes   Vision loss Yes   Ears/Nose   Ear pain    Ringing in ears Yes   Vertigo Yes   Hearing loss Yes   Nasal ulcers    Nosebleeds    Sinus pain Yes   Nasal congestion Yes   Snoring Yes   Mouth/Throat   Oral ulcers    Bleeding gums    Dry mouth    Cavities    Sore throat    Sticking in throat    Difficulty speaking    Neck/Lymphatics   Thyroid pain    Thyroid swelling    Lymph node swelling    Cardiac/Respiratory   Chest pain with breathing    Dry cough    Cough with bloody phlegm    Shortness of breath    Fast heartbeats Yes   Irregular heartbeats Yes   Gastrointestinal   Nausea    Vomiting    Difficulty swallowing    Heartburn Yes   Abdominal pain Yes   Bloody stool    Mucus stool    Genitourinary   Pelvic pain    Genital ulcers    Abnormal discharge    Burning urination    Frothy urine    Blood in urine        REVIEW OF SYSTEMS:  Except as noted in the history above, relevant review of systems with emphasis on autoimmune rheumatic diseases was otherwise negative.      CURRENT PROBLEM LIST:  Patient Active Problem List    Diagnosis Date Noted    Lumbar back pain 01/29/2025    Uveitis of both eyes 01/29/2025    Crohn's related arthritis (HCC) 06/12/2024    Moderate persistent allergic asthma 06/12/2024    Encounter for monitoring sulfasalazine therapy 06/12/2024    Crohn's disease of colon, unspecified complication (HCC) 06/12/2024    Palpitations 07/21/2022       PAST MEDICAL HISTORY:  Past Medical History:   Diagnosis Date    Anesthesia 07/29/2022    pt with strong history of motion sickness    Arrhythmia     Arthritis 07/29/2022     osteo bilateral hips    Asthma 07/29/2022    uses inhalers daily    Crohn's disease (HCC)     Endometriosis 09/16/2022    Fatty liver     Gynecological disorder 09/16/2022    Infectious disease 07/29/2022    7/15/22 pt tested positive for covid with sxs.  As of 7/29/22 pt still has sxs of congestion, cough, loss of voice.  Pt tested negative 7/28/22 at home.  Dr. Morales's office notified.    Palpitations     Pneumonia 07/29/2022    AGE 10    PONV (postoperative nausea and vomiting) 07/29/2022    pt with strong history of motion sickness    PONV (postoperative nausea and vomiting) 09/16/2022    Patient states, Severe PONV With Surgeries.       PAST SURGICAL HISTORY:  Past Surgical History:   Procedure Laterality Date    OTHER ORTHOPEDIC SURGERY Left     Surgery for dislocated left knee cap.    OTHER ORTHOPEDIC SURGERY Right     Right Shoulder Tendon Surgery.    TONSILLECTOMY      Age 35    TUBAL LIGATION      With IUD Removal, Age 31.       SOCIAL HISTORY:  Social History     Socioeconomic History    Marital status: Single     Spouse name: Not on file    Number of children: Not on file    Years of education: Not on file    Highest education level: Not on file   Occupational History    Not on file   Tobacco Use    Smoking status: Never     Passive exposure: Never    Smokeless tobacco: Never   Vaping Use    Vaping status: Never Used   Substance and Sexual Activity    Alcohol use: Never    Drug use: Never    Sexual activity: Not on file   Other Topics Concern    Not on file   Social History Narrative    Not on file     Social Drivers of Health     Financial Resource Strain: Not on file   Food Insecurity: Not on file   Transportation Needs: Not on file   Physical Activity: Not on file   Stress: Not on file   Social Connections: Not on file   Intimate Partner Violence: Not on file   Housing Stability: Not on file       FAMILY HISTORY:  No family history on file.    MEDICATIONS:  Current Outpatient Medications   Medication  Sig    Adalimumab (HUMIRA-PSORIASIS/UVEIT STARTER) 80 MG/0.8ML & 40MG/0.4ML Auto-injector Kit Inject 80 mg SC the first week, 40 mg SC the second week, then 40 mg SC every 2 weeks thereafter.    sulfaSALAzine (AZULFIDINE EN-TAB) 500 MG EC tablet Take 2 Tablets by mouth 2 times a day with meals.    fluticasone-salmeterol (ADVAIR) 250-50 MCG/ACT AEROSOL POWDER, BREATH ACTIVATED INHALE 1 DOSE BY MOUTH TWICE DAILY    SPIRIVA RESPIMAT 2.5 MCG/ACT Aero Soln INHALE 2 SPRAY(S) BY MOUTH ONCE DAILY    fluticasone (FLONASE) 50 MCG/ACT nasal spray USE 1 TO 2 SPRAY(S) IN EACH NOSTRIL ONCE DAILY    traZODone (DESYREL) 50 MG Tab TAKE 2 TABLETS BY MOUTH ONCE DAILY AT BEDTIME FOR SLEEP    albuterol 108 (90 Base) MCG/ACT Aero Soln inhalation aerosol Inhale 1 Puff.    VENTOLIN  (90 Base) MCG/ACT Aero Soln inhalation aerosol INHALE 1 TO 2 PUFFS BY MOUTH EVERY 4 TO 6 HOURS AS NEEDED    Multiple Vitamins-Minerals (MULTIPLE VITAMINS/WOMENS PO) Take  by mouth every day.    metoprolol SR (TOPROL XL) 25 MG TABLET SR 24 HR Take 25 mg by mouth every day.    Azelastine (ASTELIN) 137 MCG/SPRAY Solution 2 Sprays 2 times a day. (Patient not taking: Reported on 5/27/2025)    folic acid (FOLVITE) 1 MG Tab Take 1 mg by mouth every day. (Patient not taking: Reported on 5/27/2025)    diphenhydrAMINE HCl (BENADRYL ALLERGY PO) Take  by mouth. (Patient not taking: Reported on 5/27/2025)    amLODIPine (NORVASC) 5 MG Tab Take 5 mg by mouth every day. (Patient not taking: Reported on 5/27/2025)       ALLERGIES:   Allergies   Allergen Reactions    Hydrocodone-Acetaminophen Vomiting and Nausea     VICODIN    Morphine Vomiting and Nausea    Penicillin G Hives and Shortness of Breath     Reaction At AGE 10  Pt. States Was Given For Pneumonia.     Other Misc Nausea    Penicillins      Other Reaction(s): Not available       IMMUNIZATIONS:  Immunization History   Administered Date(s) Administered    Influenza Seasonal Injectable - Historical Data 11/01/2023  "   Influenza Vaccine Quad Inj (Pf) 09/16/2022, 09/11/2023            Objective     Vital Signs: /88 (BP Location: Left arm, Patient Position: Sitting, BP Cuff Size: Adult)   Pulse 79   Temp 36.4 °C (97.6 °F) (Temporal)   Resp 14   Ht 1.651 m (5' 5\")   Wt 73.9 kg (163 lb)   SpO2 94% Body mass index is 27.12 kg/m².    General: Appears well and comfortable  Eyes: No apparent scleral or conjunctival lesions  ENT: No apparent oral, nasal, or ear lesions  Head/Neck: No apparent scalp or neck lesions  Cardiovascular: Regular rate and rhythm  Respiratory: Breathing quiet and unlabored  Gastrointestinal: No apparent organomegaly or abdominal masses  Integumentary: No significant cutaneous lesions or dyspigmentation  Musculoskeletal: No significant joint tenderness, swelling, warmth, erythema, or overt synovitis; no significant restriction in range of motion of joints examined  Neurologic: No focal sensory or motor deficits  Psychiatric: Mood and affect appropriate      LABORATORY RESULTS REVIEWED AND INTERPRETED:  Lab Results   Component Value Date/Time    CREACTPROT <0.30 05/20/2025 07:00 AM    SEDRATEWES 10 05/20/2025 07:00 AM    URICACID 6.3 06/09/2023 08:48 AM     Lab Results   Component Value Date/Time    A7WAPNNUECO 175.6 08/29/2023 07:16 AM    E7WTAOURZJZ 28.2 08/29/2023 07:16 AM     Lab Results   Component Value Date/Time    RHEUMFACTN <10 08/29/2023 07:16 AM    CCPANTIBODY 3 06/09/2023 08:48 AM    VCST96PNET See Note 08/29/2023 07:16 AM     Lab Results   Component Value Date/Time    ANTINUCAB None Detected 08/29/2023 07:16 AM     Lab Results   Component Value Date/Time    ANTIDNADS SEE BELOW 08/29/2023 07:16 AM    SMITHAB 2 08/29/2023 07:16 AM    RNPAB 2 08/29/2023 07:16 AM    CENTROMBAB 1 08/29/2023 07:16 AM    SSA60 2 08/29/2023 07:16 AM    SSA52 4 08/29/2023 07:16 AM    ANTISSBSJ 1 08/29/2023 07:16 AM     Lab Results   Component Value Date/Time    GBMABG Negative 12/07/2023 08:39 AM     Lab Results "   Component Value Date/Time     12/30/2024 07:41 AM     12/30/2024 07:41 AM     Lab Results   Component Value Date/Time    TSHULTRASEN 1.810 11/12/2024 06:28 AM     Lab Results   Component Value Date/Time    25HYDROXY 41 08/29/2023 07:16 AM    PTHINTACT 47.4 04/25/2023 08:19 AM     Lab Results   Component Value Date/Time    FERRITIN 61.0 12/09/2024 06:15 AM    IRON 141 12/09/2024 06:15 AM    TRANSFERRIN 286 02/10/2023 03:00 PM     Lab Results   Component Value Date/Time    WBC 5.1 05/20/2025 07:00 AM    RBC 4.05 (L) 05/20/2025 07:00 AM    HEMOGLOBIN 13.8 05/20/2025 07:00 AM    HEMATOCRIT 40.6 05/20/2025 07:00 AM    .2 (H) 05/20/2025 07:00 AM    MCH 34.1 (H) 05/20/2025 07:00 AM    MCHC 34.0 05/20/2025 07:00 AM    RDW 42.7 05/20/2025 07:00 AM    PLATELETCT 304 05/20/2025 07:00 AM    MPV 9.2 05/20/2025 07:00 AM    NEUTS 1.81 (L) 05/20/2025 07:00 AM    LYMPHOCYTES 44.80 (H) 05/20/2025 07:00 AM    MONOCYTES 7.90 05/20/2025 07:00 AM    EOSINOPHILS 10.80 (H) 05/20/2025 07:00 AM    BASOPHILS 0.80 05/20/2025 07:00 AM     Lab Results   Component Value Date/Time    ASTSGOT 27 05/20/2025 07:00 AM    ALTSGPT 31 05/20/2025 07:00 AM    ALKPHOSPHAT 60 05/20/2025 07:00 AM    TBILIRUBIN 0.5 05/20/2025 07:00 AM    TOTPROTEIN 7.4 05/20/2025 07:00 AM    ALBUMIN 4.7 05/20/2025 07:00 AM     Lab Results   Component Value Date/Time    SODIUM 140 05/20/2025 07:00 AM    POTASSIUM 4.0 05/20/2025 07:00 AM    CHLORIDE 104 05/20/2025 07:00 AM    CO2 24 05/20/2025 07:00 AM    GLUCOSE 101 (H) 05/20/2025 07:00 AM    BUN 12 05/20/2025 07:00 AM    CREATININE 0.90 05/20/2025 07:00 AM    BUNCREATRAT 15 08/26/2022 04:23 AM    CALCIUM 9.5 05/20/2025 07:00 AM    MAGNESIUM 1.9 08/26/2022 04:23 AM     Lab Results   Component Value Date/Time    TOTALVOLUME Random 11/12/2024 06:28 AM    MICROALBUR 4.3 11/12/2024 06:28 AM    CREATININEU 1156 04/27/2023 05:30 AM    MALBCRT 10 11/12/2024 06:28 AM     Lab Results   Component Value Date/Time     COLORURINE Yellow 04/15/2024 02:02 PM    SPECGRAVITY 1.017 04/15/2024 02:02 PM    PHURINE 7.0 04/15/2024 02:02 PM    GLUCOSEUR Negative 04/15/2024 02:02 PM    KETONES Negative 04/15/2024 02:02 PM    PROTEINURIN Negative 04/15/2024 02:02 PM     Lab Results   Component Value Date/Time    HEPBSAG Non-Reactive 05/21/2024 07:11 AM    HEPBCORIGM Non-Reactive 12/07/2023 08:39 AM    HEPCAB Non-Reactive 12/07/2023 08:39 AM     Lab Results   Component Value Date/Time    CHOLSTRLTOT 196 11/12/2024 06:28 AM     (H) 11/12/2024 06:28 AM    HDL 46 11/12/2024 06:28 AM    TRIGLYCERIDE 113 11/12/2024 06:28 AM    HBA1C 5.5 11/12/2024 06:28 AM       RADIOLOGY RESULTS REVIEWED AND INTERPRETED:  Results for orders placed in visit on 01/24/23    DX-ANKLE 3+ VIEWS LEFT    Impression  No evidence of fracture or dislocation.    Results for orders placed in visit on 09/19/24    DX-FINGER(S) 2+ LEFT    Impression  Negative frontal view of the hand and index finger series    Results for orders placed during the hospital encounter of 06/24/24    DX-CERVICAL SPINE-2 OR 3 VIEWS    Impression  Unremarkable cervical spine.    Results for orders placed during the hospital encounter of 05/19/23    DS-BONE DENSITY STUDY (DEXA)    Impression  According to the World Health Organization classification, bone mineral density of this patient is normal in the lumbar spine and left femur.    10-year Probability of Fracture:  Major Osteoporotic     1.2%  Hip     0.0%  Population      [place] ()    Based on left femur neck BMD      All relevant laboratory and imaging results reported on this note were reviewed and interpreted by me.         Assessment & Plan     Gina Little is a 44 y.o. female with history and physical as noted above whose presentation merits the following clinical impressions and recommendations:    1. Crohn's related arthritis (HCC)  Clinically and serologically stable without significant evidence of disease activity  on the current regimen of sulfasalazine, but given the associated uveitis which remains problematic, reasonable to initiate biologic therapy with Humira. Given the potential for smoldering disease activity with limited clinical manifestations, need to occasionally reassess markers of disease activity and risk stratification to gauge overall trajectory on the current treatment.  - CRP QUANTITIVE (NON-CARDIAC); Future  - Sed Rate; Future  - Adalimumab (HUMIRA-PSORIASIS/UVEIT STARTER) 80 MG/0.8ML & 40MG/0.4ML Auto-injector Kit; Inject 80 mg SC the first week, 40 mg SC the second week, then 40 mg SC every 2 weeks thereafter.  Dispense: 2 Each; Refill: 0  - Continue sulfasalazine 1000 mg twice daily    2. Uveitis of both eyes  Clinically active with a fluctuating course, reasonable to initiate biologic therapy with Humira.  - Adalimumab (HUMIRA-PSORIASIS/UVEIT STARTER) 80 MG/0.8ML & 40MG/0.4ML Auto-injector Kit; Inject 80 mg SC the first week, 40 mg SC the second week, then 40 mg SC every 2 weeks thereafter.  Dispense: 2 Each; Refill: 0    3. Crohn's disease of colon, unspecified complication (HCC)  Clinically and serologically stable without significant evidence of disease activity on the current regimen of sulfasalazine, but given the associated uveitis which remains problematic, reasonable to initiate biologic therapy with Humira.  - Adalimumab (HUMIRA-PSORIASIS/UVEIT STARTER) 80 MG/0.8ML & 40MG/0.4ML Auto-injector Kit; Inject 80 mg SC the first week, 40 mg SC the second week, then 40 mg SC every 2 weeks thereafter.  Dispense: 2 Each; Refill: 0  - Routine follow-up with gastroenterology    4. Moderate persistent allergic asthma  Clinically fluctuating course for which she needed Xolair which unfortunately was not covered by her insurance.  - Ventolin and Flonase as needed per allergy/immunology  - Routine follow-up with allergy/immunology    5. Chronic fatigue, unspecified  Etiology unclear, so reasonable to evaluate  for common reversible causes.  - FOLATE; Future  - VITAMIN B6  - VITAMIN B12; Future  - VITAMIN D,25 HYDROXY (DEFICIENCY); Future    6. Encounter for monitoring sulfasalazine therapy  High risk immunomodulator use requiring routine monitoring labs prior to every visit to assess for possible side effects including but not limited to myelotoxicity, hepatotoxicity, and nephrotoxicity.  - CBC WITH DIFFERENTIAL; Future  - Comp Metabolic Panel; Future      The above assessment and plan were discussed with the patient who acknowledged understanding of the plan.    FOLLOW-UP: Return in about 6 months (around 11/27/2025) for Short.         Thank you for the opportunity to participate in the care of Gina Little.    Saurabh Paris MD, MS, FACR  Rheumatologist, [facility], [facility]   of Clinical Medicine, Department of Internal Medicine  [employer] [facility]

## 2025-05-27 NOTE — TELEPHONE ENCOUNTER
Prior Authorization for Humira Pen-Psor/Uveit Starter (CF) 80 MG/0.8ML &40MG/0.4ML auto-injector kit (Quantity: 3, Days: 28) has been submitted via Cover My Meds: Key ([id number])    Insurance: [employer]    Will follow up in 24-48 business hours.

## 2025-05-27 NOTE — PATIENT INSTRUCTIONS
[facility] RHEUM[facility] AFTER VISIT GUIDE    Below are important guidelines to help you navigate your health care needs and assist us in caring for you safely and effectively. We encourage you to carefully read and understand this information and adhere to them accordingly.    [employer] Messaging and Phone Calls:  Diagnosis and Treatment - For a detailed explanation of your condition and treatment plan from today's visit, refer to the visit note on [employer] via the following steps:  Log in to [employer] and click on “Visits” at the top.  Scroll down to “Past Visits” under Appointments.  Click on “View Notes” under the appropriate visit date.  Questions or Concerns - MyChart messaging is for non-urgent matters that do not require immediate attention and should be brief with no more than two questions or concerns. If you have multiple questions or concerns, we ask that you schedule an appointment to have them properly addressed.  Response to Messages - [employer] messages are addressed throughout the week depending on clinical availability, so we ask that you allow up to one week for a response.  Phone Calls and Voicemails - Phone calls and voicemail messages are reserved for time-sensitive matters that cannot wait to be addressed via [employer]. We ask that you refrain from calling the office multiple times or leaving multiple voicemails regarding the same issue as doing so may lead to delays in response time.  Urgent Issues - For urgent medical matters or medical emergencies that cannot wait, you are advised to go to your nearest Urgent Care or Emergency Department for immediate attention.    Laboratory Tests and Imaging Studies:  Future Lab and Imaging Orders - We ask that you get your lab tests and imaging studies done no later than one week before your follow-up visit unless instructed otherwise.  Results Communication - You may see some test results marked as “abnormal” that are not necessarily significant or concerning. If  there are significant abnormalities on your test results that warrant further action, you will be notified via MyChart or phone call, otherwise they will be addressed at your follow-up visit.    Prescriptions and Refill Requests:  General Prescriptions (e.g. prednisone, hydroxychloroquine, leflunomide, methotrexate, etc.) - These are sent to Retail Pharmacies, so all refill requests of these medications should be directed to your local pharmacy.  Specialty Prescriptions (e.g. Enbrel, Humira, Cosentyx, Xeljanz, etc.) - These are sent to Specialty Pharmacies, so all refill requests of these medications should be directed to your designated specialty pharmacy.  Infusion Prescriptions (e.g. Remicade, Simponi Aria, Rituxan, Saphnelo, etc.) - These are sent to Outpatient Infusion Centers, so all scheduling requests of these medications should be directed to your local infusion center.    Medication Risks and Adverse Effects:  Immunosuppressed Status - Steroids and antirheumatic drugs are immunosuppressants, so they increase the risk of infections and can have side effects on various organ systems in your body, though most of them are uncommon.  Potential Side Effects - Be sure to read the drug package inserts to learn about the potential side effects of your medications before you start taking them and take them exactly as prescribed unless instructed otherwise.  In Case of Side Effects - If you experience any significant side effects, stop taking the medication immediately and promptly notify the prescriber. Depending on the severity of the side effects, consider going to an Urgent Care or Emergency Department for immediate attention.    Immunizations and Health Screening:  Vaccinations - If you are on immunosuppressive therapy, it is important that you are up to date on age-appropriate immunizations, particularly shingles and pneumonia vaccines, which you can request from your primary care provider or from us at your  next appointment.  Screening Tests - It is also important that you are up to date on age-appropriate screening tests, such as pap smear, mammography, and colonoscopy, which you can request from your primary care provider.    Educational and Supportive Resources:  [employer] [facility] ([email or web address]) - Visit our website to learn more about your condition and other rheumatic diseases, and gain access to many helpful resources for them.  Disposal of Old Medications (www.[place].gov/everyday-takeback-day) - Visit the Drug Enforcement Administration website to find a nearby location where you can properly dispose of old medications you no longer need.  Disposal of Used [place] (www.safeneedledisposal.org) - Visit the Safe Needle Disposal Organization website to find a nearby location where you can properly dispose of used needles from your injectable medications.

## 2025-05-28 ENCOUNTER — HOSPITAL ENCOUNTER (OUTPATIENT)
Dept: LAB | Facility: MEDICAL CENTER | Age: 45
End: 2025-05-28
Attending: STUDENT IN AN ORGANIZED HEALTH CARE EDUCATION/TRAINING PROGRAM
Payer: COMMERCIAL

## 2025-05-28 DIAGNOSIS — R53.82 CHRONIC FATIGUE, UNSPECIFIED: ICD-10-CM

## 2025-05-28 LAB
25(OH)D3 SERPL-MCNC: 23 NG/ML (ref 30–100)
FOLATE SERPL-MCNC: 39.4 NG/ML
VIT B12 SERPL-MCNC: 700 PG/ML (ref 211–911)

## 2025-05-28 PROCEDURE — 82607 VITAMIN B-12: CPT

## 2025-05-28 PROCEDURE — 82746 ASSAY OF FOLIC ACID SERUM: CPT

## 2025-05-28 PROCEDURE — 36415 COLL VENOUS BLD VENIPUNCTURE: CPT

## 2025-05-28 PROCEDURE — 82306 VITAMIN D 25 HYDROXY: CPT

## 2025-05-31 ENCOUNTER — RESULTS FOLLOW-UP (OUTPATIENT)
Dept: RHEUMATOLOGY | Facility: MEDICAL CENTER | Age: 45
End: 2025-05-31

## 2025-05-31 DIAGNOSIS — E55.9 VITAMIN D INSUFFICIENCY: Primary | ICD-10-CM

## 2025-05-31 RX ORDER — ERGOCALCIFEROL 1.25 MG/1
50000 CAPSULE ORAL
Qty: 12 CAPSULE | Refills: 1 | Status: SHIPPED | OUTPATIENT
Start: 2025-05-31

## 2025-06-20 DIAGNOSIS — K50.119 CROHN'S DISEASE OF COLON, UNSPECIFIED COMPLICATION (HCC): ICD-10-CM

## 2025-06-20 DIAGNOSIS — M07.60 CROHN'S RELATED ARTHRITIS (HCC): ICD-10-CM

## 2025-06-20 DIAGNOSIS — K50.90 CROHN'S RELATED ARTHRITIS (HCC): ICD-10-CM

## 2025-06-20 DIAGNOSIS — H20.9 UVEITIS OF BOTH EYES: ICD-10-CM

## 2025-06-23 NOTE — TELEPHONE ENCOUNTER
Received request via: Pharmacy    Was the patient seen in the last year in this department? Yes    Does the patient have an active prescription (recently filled or refills available) for medication(s) requested? No    [facility] - [place], [place] - [address]

## 2025-06-25 RX ORDER — ADALIMUMAB 4 MG/ML
KIT INJECTION
Qty: 3 EACH | Refills: 0 | Status: SHIPPED
Start: 2025-06-25 | End: 2025-06-26

## 2025-06-26 DIAGNOSIS — K50.119 CROHN'S DISEASE OF COLON, UNSPECIFIED COMPLICATION (HCC): ICD-10-CM

## 2025-06-26 DIAGNOSIS — H20.9 UVEITIS OF BOTH EYES: ICD-10-CM

## 2025-06-26 DIAGNOSIS — K50.90 CROHN'S RELATED ARTHRITIS (HCC): ICD-10-CM

## 2025-06-26 DIAGNOSIS — M07.60 CROHN'S RELATED ARTHRITIS (HCC): ICD-10-CM

## 2025-06-26 RX ORDER — ADALIMUMAB 4 MG/ML
KIT INJECTION
Qty: 3 EACH | Refills: 0 | Status: SHIPPED | OUTPATIENT
Start: 2025-06-26 | End: 2025-06-26

## 2025-07-07 ENCOUNTER — PATIENT MESSAGE (OUTPATIENT)
Dept: RHEUMATOLOGY | Facility: MEDICAL CENTER | Age: 45
End: 2025-07-07
Payer: COMMERCIAL

## 2025-07-11 NOTE — PROGRESS NOTES
She needs to continue on sulfasalazine until I reevaluate at her next follow-up visit. The labs I ordered at her last visit should be done about 1 week before her next visit.    Saurabh Paris M.D.

## 2025-07-28 ENCOUNTER — HOSPITAL ENCOUNTER (OUTPATIENT)
Dept: LAB | Facility: MEDICAL CENTER | Age: 45
End: 2025-07-28
Payer: COMMERCIAL

## 2025-07-28 LAB
ALBUMIN SERPL BCP-MCNC: 4.5 G/DL (ref 3.2–4.9)
ALBUMIN/GLOB SERPL: 1.7 G/DL
ALP SERPL-CCNC: 54 U/L (ref 30–99)
ALT SERPL-CCNC: 34 U/L (ref 2–50)
ANION GAP SERPL CALC-SCNC: 13 MMOL/L (ref 7–16)
AST SERPL-CCNC: 28 U/L (ref 12–45)
BASOPHILS # BLD AUTO: 1.2 % (ref 0–1.8)
BASOPHILS # BLD: 0.06 K/UL (ref 0–0.12)
BILIRUB SERPL-MCNC: 0.6 MG/DL (ref 0.1–1.5)
BUN SERPL-MCNC: 10 MG/DL (ref 8–22)
CALCIUM ALBUM COR SERPL-MCNC: 9 MG/DL (ref 8.5–10.5)
CALCIUM SERPL-MCNC: 9.4 MG/DL (ref 8.5–10.5)
CHLORIDE SERPL-SCNC: 102 MMOL/L (ref 96–112)
CO2 SERPL-SCNC: 22 MMOL/L (ref 20–33)
CREAT SERPL-MCNC: 0.8 MG/DL (ref 0.5–1.4)
EOSINOPHIL # BLD AUTO: 0.39 K/UL (ref 0–0.51)
EOSINOPHIL NFR BLD: 7.5 % (ref 0–6.9)
ERYTHROCYTE [DISTWIDTH] IN BLOOD BY AUTOMATED COUNT: 43.7 FL (ref 35.9–50)
GFR SERPLBLD CREATININE-BSD FMLA CKD-EPI: 92 ML/MIN/1.73 M 2
GLOBULIN SER CALC-MCNC: 2.6 G/DL (ref 1.9–3.5)
GLUCOSE SERPL-MCNC: 94 MG/DL (ref 65–99)
HBV CORE AB SERPL QL IA: NONREACTIVE
HBV SURFACE AB SERPL IA-ACNC: 412 MIU/ML (ref 0–10)
HCT VFR BLD AUTO: 38.8 % (ref 37–47)
HGB BLD-MCNC: 13.4 G/DL (ref 12–16)
IMM GRANULOCYTES # BLD AUTO: 0.01 K/UL (ref 0–0.11)
IMM GRANULOCYTES NFR BLD AUTO: 0.2 % (ref 0–0.9)
LYMPHOCYTES # BLD AUTO: 2.25 K/UL (ref 1–4.8)
LYMPHOCYTES NFR BLD: 43.3 % (ref 22–41)
MCH RBC QN AUTO: 34.1 PG (ref 27–33)
MCHC RBC AUTO-ENTMCNC: 34.5 G/DL (ref 32.2–35.5)
MCV RBC AUTO: 98.7 FL (ref 81.4–97.8)
MONOCYTES # BLD AUTO: 0.34 K/UL (ref 0–0.85)
MONOCYTES NFR BLD AUTO: 6.5 % (ref 0–13.4)
NEUTROPHILS # BLD AUTO: 2.15 K/UL (ref 1.82–7.42)
NEUTROPHILS NFR BLD: 41.3 % (ref 44–72)
NRBC # BLD AUTO: 0 K/UL
NRBC BLD-RTO: 0 /100 WBC (ref 0–0.2)
PLATELET # BLD AUTO: 311 K/UL (ref 164–446)
PMV BLD AUTO: 9.7 FL (ref 9–12.9)
POTASSIUM SERPL-SCNC: 3.8 MMOL/L (ref 3.6–5.5)
PROT SERPL-MCNC: 7.1 G/DL (ref 6–8.2)
RBC # BLD AUTO: 3.93 M/UL (ref 4.2–5.4)
SODIUM SERPL-SCNC: 137 MMOL/L (ref 135–145)
WBC # BLD AUTO: 5.2 K/UL (ref 4.8–10.8)

## 2025-07-28 PROCEDURE — 86704 HEP B CORE ANTIBODY TOTAL: CPT

## 2025-07-28 PROCEDURE — 80053 COMPREHEN METABOLIC PANEL: CPT

## 2025-07-28 PROCEDURE — 36415 COLL VENOUS BLD VENIPUNCTURE: CPT

## 2025-07-28 PROCEDURE — 86480 TB TEST CELL IMMUN MEASURE: CPT

## 2025-07-28 PROCEDURE — 86706 HEP B SURFACE ANTIBODY: CPT

## 2025-07-28 PROCEDURE — 85025 COMPLETE CBC W/AUTO DIFF WBC: CPT

## 2025-07-28 PROCEDURE — 83993 ASSAY FOR CALPROTECTIN FECAL: CPT

## 2025-07-30 LAB
GAMMA INTERFERON BACKGROUND BLD IA-ACNC: 0.03 IU/ML
M TB IFN-G BLD-IMP: NEGATIVE
M TB IFN-G CD4+ BCKGRND COR BLD-ACNC: 0.02 IU/ML
MITOGEN IGNF BCKGRD COR BLD-ACNC: >10 IU/ML
QFT TB2 - NIL TBQ2: 0.01 IU/ML

## 2025-07-31 LAB — CALPROTECTIN STL-MCNT: 45 UG/G

## 2025-08-15 ENCOUNTER — OFFICE VISIT (OUTPATIENT)
Dept: URGENT CARE | Facility: CLINIC | Age: 45
End: 2025-08-15

## 2025-08-15 VITALS
TEMPERATURE: 97.2 F | DIASTOLIC BLOOD PRESSURE: 88 MMHG | WEIGHT: 169 LBS | BODY MASS INDEX: 28.16 KG/M2 | OXYGEN SATURATION: 97 % | RESPIRATION RATE: 14 BRPM | SYSTOLIC BLOOD PRESSURE: 138 MMHG | HEART RATE: 61 BPM | HEIGHT: 65 IN

## 2025-08-15 DIAGNOSIS — Z02.1 PHYSICAL EXAM, PRE-EMPLOYMENT: Primary | ICD-10-CM

## 2025-08-15 PROCEDURE — 80305 DRUG TEST PRSMV DIR OPT OBS: CPT | Performed by: NURSE PRACTITIONER

## 2025-08-15 RX ORDER — PROPRANOLOL HYDROCHLORIDE 60 MG/1
CAPSULE, EXTENDED RELEASE ORAL
COMMUNITY
Start: 2025-06-03

## 2025-08-15 RX ORDER — ONDANSETRON 4 MG/1
4 TABLET, FILM COATED ORAL EVERY 4 HOURS PRN
COMMUNITY

## 2025-08-15 RX ORDER — ESTRADIOL 0.07 MG/D
PATCH, EXTENDED RELEASE TRANSDERMAL
COMMUNITY
Start: 2025-06-03

## 2025-08-15 RX ORDER — DICYCLOMINE HYDROCHLORIDE 10 MG/1
CAPSULE ORAL
COMMUNITY

## 2025-08-15 RX ORDER — ESTRADIOL 0.05 MG/D
PATCH, EXTENDED RELEASE TRANSDERMAL
COMMUNITY

## 2025-08-15 RX ORDER — FLUTICASONE FUROATE, UMECLIDINIUM BROMIDE AND VILANTEROL TRIFENATATE 200; 62.5; 25 UG/1; UG/1; UG/1
POWDER RESPIRATORY (INHALATION)
COMMUNITY
Start: 2025-03-17

## 2025-08-15 ASSESSMENT — VISUAL ACUITY
OD_CC: 20/20
OS_CC: 20/25

## 2025-08-15 ASSESSMENT — FIBROSIS 4 INDEX: FIB4 SCORE: 0.71

## 2025-08-21 ENCOUNTER — NON-PROVIDER VISIT (OUTPATIENT)
Dept: URGENT CARE | Facility: PHYSICIAN GROUP | Age: 45
End: 2025-08-21

## 2025-08-21 DIAGNOSIS — Z02.1 PRE-EMPLOYMENT DRUG SCREENING: Primary | ICD-10-CM

## 2025-08-21 LAB
AMP AMPHETAMINE: NORMAL
COC COCAINE: NORMAL
INT CON NEG: NORMAL
INT CON POS: NORMAL
MET METHAMPHETAMINES: NORMAL
OPI OPIATES: NORMAL
PCP PHENCYCLIDINE: NORMAL
POC DRUG COMMENT 753798-OCCUPATIONAL HEALTH: NORMAL
THC: NORMAL

## 2025-08-21 PROCEDURE — 80305 DRUG TEST PRSMV DIR OPT OBS: CPT
